# Patient Record
Sex: FEMALE | Race: WHITE | Employment: OTHER | URBAN - METROPOLITAN AREA
[De-identification: names, ages, dates, MRNs, and addresses within clinical notes are randomized per-mention and may not be internally consistent; named-entity substitution may affect disease eponyms.]

---

## 2020-07-16 ENCOUNTER — HOSPITAL ENCOUNTER (OUTPATIENT)
Dept: INFUSION THERAPY | Age: 75
Setting detail: INFUSION SERIES
Discharge: HOME | End: 2020-07-16
Payer: MEDICARE

## 2020-07-16 ENCOUNTER — HOSPITAL ENCOUNTER (OUTPATIENT)
Age: 75
Discharge: HOME | End: 2020-07-16
Payer: MEDICARE

## 2020-07-16 LAB
ABO/RH: NORMAL
ANTIBODY SCREEN: NORMAL

## 2020-07-16 PROCEDURE — 86901 BLOOD TYPING SEROLOGIC RH(D): CPT

## 2020-07-16 PROCEDURE — 86923 COMPATIBILITY TEST ELECTRIC: CPT

## 2020-07-16 PROCEDURE — P9016 RBC LEUKOCYTES REDUCED: HCPCS

## 2020-07-16 PROCEDURE — 36415 COLL VENOUS BLD VENIPUNCTURE: CPT

## 2020-07-16 PROCEDURE — 86900 BLOOD TYPING SEROLOGIC ABO: CPT

## 2020-07-16 PROCEDURE — 86850 RBC ANTIBODY SCREEN: CPT

## 2020-07-16 RX ORDER — 0.9 % SODIUM CHLORIDE 0.9 %
20 INTRAVENOUS SOLUTION INTRAVENOUS ONCE
Status: CANCELLED | OUTPATIENT
Start: 2020-07-16 | End: 2020-07-16

## 2020-07-16 RX ORDER — SODIUM CHLORIDE 0.9 % (FLUSH) 0.9 %
10 SYRINGE (ML) INJECTION PRN
Status: CANCELLED | OUTPATIENT
Start: 2020-07-16

## 2020-07-16 NOTE — PROGRESS NOTES
Outpatient [place] [address]    Peripheral Lab Draw    NAME:  Tatianna Vidal  YOB: 1945  MEDICAL RECORD NUMBER:  [id number]  Episode Date:  7/16/2020    Blood Draw Site: Location:right arm  Site cleansed with Chloroprep Scrub for 30 seconds: Yes  Site cleansed with Alcohol pads: Yes  Labs drawn with: 25 gauge             [x] Butterfly    [] Needle  Labs Obtained: Yes  Number of attempts: 1    Lab Test(s) Ordered: Type and Screen    Lab Draw Site:  Redness: No  Bruising: No   Edema: No  Pain: No     Response to treatment:  Well tolerated by patient.       Electronically signed by Darrold Gilford, RN on 7/16/2020 at 4:56 PM

## 2020-07-17 ENCOUNTER — HOSPITAL ENCOUNTER (OUTPATIENT)
Dept: INFUSION THERAPY | Age: 75
Setting detail: INFUSION SERIES
Discharge: HOME | End: 2020-07-17
Payer: MEDICARE

## 2020-07-17 VITALS
HEART RATE: 88 BPM | RESPIRATION RATE: 16 BRPM | OXYGEN SATURATION: 98 % | DIASTOLIC BLOOD PRESSURE: 70 MMHG | TEMPERATURE: 98.2 F | SYSTOLIC BLOOD PRESSURE: 155 MMHG

## 2020-07-17 LAB
BLOOD BANK DISPENSE STATUS: NORMAL
BLOOD BANK PRODUCT CODE: NORMAL
BPU ID: NORMAL
DESCRIPTION BLOOD BANK: NORMAL

## 2020-07-17 PROCEDURE — 2580000003 HC RX 258: Performed by: INTERNAL MEDICINE

## 2020-07-17 PROCEDURE — 2580000003 HC RX 258

## 2020-07-17 PROCEDURE — 36430 TRANSFUSION BLD/BLD COMPNT: CPT

## 2020-07-17 PROCEDURE — P9016 RBC LEUKOCYTES REDUCED: HCPCS

## 2020-07-17 RX ORDER — SODIUM CHLORIDE 0.9 % (FLUSH) 0.9 %
10 SYRINGE (ML) INJECTION PRN
Status: DISCONTINUED | OUTPATIENT
Start: 2020-07-17 | End: 2020-07-18 | Stop reason: HOSPADM

## 2020-07-17 RX ORDER — SODIUM CHLORIDE 9 MG/ML
INJECTION, SOLUTION INTRAVENOUS
Status: COMPLETED
Start: 2020-07-17 | End: 2020-07-17

## 2020-07-17 RX ORDER — 0.9 % SODIUM CHLORIDE 0.9 %
20 INTRAVENOUS SOLUTION INTRAVENOUS ONCE
Status: COMPLETED | OUTPATIENT
Start: 2020-07-17 | End: 2020-07-17

## 2020-07-17 RX ADMIN — Medication: at 12:50

## 2020-07-17 RX ADMIN — Medication 20 ML: at 12:45

## 2020-07-17 RX ADMIN — SODIUM CHLORIDE: 9 INJECTION, SOLUTION INTRAVENOUS at 12:50

## 2020-07-17 RX ADMIN — Medication 10 ML: at 15:30

## 2020-07-17 NOTE — PROGRESS NOTES
Outpatient [address] [facility]    Blood Transfusion    NAME:  Milton Duran OF BIRTH:  1945  MEDICAL RECORD NUMBER:  [id number]  DATE:  7/17/2020     Patient arrived to [facility]   [] per wheelchair   [x] ambulatory      Alert and oriented X 4. States receiving Procrit SQ and Fereheme Iv for anemia but this week HGB dropped to 8.3. 1 unit of PRBC ordered. States has increased SOB and fatigue. Consent Obtained: Yes  Is this the patient's first Transfusion? No. Received once in 2003   Did the patient experience any adverse reactions to previous Transfusion? No  Patient Active Problem List   Diagnosis    Acquired autoimmune hemolytic anemia (HCC)    Anemia in CKD (chronic kidney disease)    Chronic kidney disease, stage III (moderate) ([facility] [address]     Patient with Bone Marrow Suppression due to chemotherapy? No anemia due to kidney disfunction  Patient with history of GI bleeding? No  Patient with history of CHF? No  Patient with history of COPD?  No    Hemoglobin/Hematocrit:    Lab Results   Component Value Date    HGB 9.4 08/23/2017    HCT 30.4 08/23/2017       Special Transfusion Products Requested: No  Blood was:  []  Irradiated    [] Washed    [] Other    Is the patient experiencing any:  Fatigue:   []   None  []   Increase over baseline but not altering normal activities  [x]   Moderate of causing difficulty performing some activities  []   Severe or loss of ability to perform some activities  []   Bedridden or disabling    Dizziness or Lightheadedness:   [x]   None  []   No Interference  []   Interferes with functioning but not activities of daily living  []   Interferes with daily activies  []   Bedridden or disabling    Shortness of Breath:   []   None   [x]   Dyspneic on exertion   []   Dyspnea with normal activities  []   Dyspnea at rest    Breath Sounds: No increased work of breathing, Breath sounds clear to auscultation bilaterally and Good air exchange    Edema: none Location of edema: nothing. States has edema if legs are dependent for long periods. Sitting in recliner with feet elevated. Tachycardia: No    Heart Palpitations: No      Chest Pain: No    Premedicated: None ordered. Patient takes Tylenol and claritin daily AM and Tylenol and benadryl at HS  [] Tylenol 325 mg oral  [] Tylenol 650 mg oral    [] Benadryl 25 mg oral   [] Benadryl 50 mg oral  [] Other    Administered Blood via: [x] Peripheral access    [] PICC access    [] Port access    Numbers of units transfused 1 over 2 hours    Monitoring of vital signs and rate changes are documented in flow sheets. Response to treatment:  Well tolerated by patient. Education:    Verbalized understanding. Verbal and written instructions given    Assisted to discharge bridge per wheel chair and assisted to car.       Electronically signed by Santiago Del Castillo RN on 7/17/2020 at 12:04 PM

## 2022-10-25 ENCOUNTER — HOSPITAL ENCOUNTER (OUTPATIENT)
Dept: INFUSION THERAPY | Age: 77
Setting detail: INFUSION SERIES
Discharge: HOME | End: 2022-10-25
Payer: MEDICARE

## 2022-10-25 DIAGNOSIS — D59.10 ACQUIRED AUTOIMMUNE HEMOLYTIC ANEMIA (HCC): Primary | ICD-10-CM

## 2022-10-25 LAB
ABO/RH: NORMAL
ANTIBODY SCREEN: NORMAL

## 2022-10-25 PROCEDURE — 36415 COLL VENOUS BLD VENIPUNCTURE: CPT

## 2022-10-25 PROCEDURE — 86901 BLOOD TYPING SEROLOGIC RH(D): CPT

## 2022-10-25 PROCEDURE — 86850 RBC ANTIBODY SCREEN: CPT

## 2022-10-25 PROCEDURE — 86900 BLOOD TYPING SEROLOGIC ABO: CPT

## 2022-10-25 PROCEDURE — 86923 COMPATIBILITY TEST ELECTRIC: CPT

## 2022-10-25 PROCEDURE — P9016 RBC LEUKOCYTES REDUCED: HCPCS

## 2022-10-25 RX ORDER — SODIUM CHLORIDE 9 MG/ML
20 INJECTION, SOLUTION INTRAVENOUS CONTINUOUS
Status: CANCELLED | OUTPATIENT
Start: 2022-10-27

## 2022-10-25 RX ORDER — ALBUTEROL SULFATE 90 UG/1
4 AEROSOL, METERED RESPIRATORY (INHALATION) PRN
Status: CANCELLED | OUTPATIENT
Start: 2022-10-25

## 2022-10-25 RX ORDER — ONDANSETRON 2 MG/ML
8 INJECTION INTRAMUSCULAR; INTRAVENOUS
OUTPATIENT
Start: 2022-10-27

## 2022-10-25 RX ORDER — ALBUTEROL SULFATE 90 UG/1
4 AEROSOL, METERED RESPIRATORY (INHALATION) PRN
OUTPATIENT
Start: 2022-10-27

## 2022-10-25 RX ORDER — SODIUM CHLORIDE 0.9 % (FLUSH) 0.9 %
5-40 SYRINGE (ML) INJECTION PRN
Status: CANCELLED | OUTPATIENT
Start: 2022-10-27

## 2022-10-25 RX ORDER — EPINEPHRINE 1 MG/ML
0.3 INJECTION, SOLUTION, CONCENTRATE INTRAVENOUS PRN
OUTPATIENT
Start: 2022-10-27

## 2022-10-25 RX ORDER — DIPHENHYDRAMINE HYDROCHLORIDE 50 MG/ML
50 INJECTION INTRAMUSCULAR; INTRAVENOUS
OUTPATIENT
Start: 2022-10-27

## 2022-10-25 RX ORDER — DIPHENHYDRAMINE HYDROCHLORIDE 50 MG/ML
50 INJECTION INTRAMUSCULAR; INTRAVENOUS
Status: CANCELLED | OUTPATIENT
Start: 2022-10-25

## 2022-10-25 RX ORDER — SODIUM CHLORIDE 9 MG/ML
INJECTION, SOLUTION INTRAVENOUS CONTINUOUS
Status: CANCELLED | OUTPATIENT
Start: 2022-10-25

## 2022-10-25 RX ORDER — SODIUM CHLORIDE 0.9 % (FLUSH) 0.9 %
5-40 SYRINGE (ML) INJECTION PRN
Status: CANCELLED | OUTPATIENT
Start: 2022-10-25

## 2022-10-25 RX ORDER — ACETAMINOPHEN 325 MG/1
650 TABLET ORAL ONCE
Status: CANCELLED | OUTPATIENT
Start: 2022-10-27 | End: 2022-10-27

## 2022-10-25 RX ORDER — ONDANSETRON 2 MG/ML
8 INJECTION INTRAMUSCULAR; INTRAVENOUS
Status: CANCELLED | OUTPATIENT
Start: 2022-10-25

## 2022-10-25 RX ORDER — ACETAMINOPHEN 325 MG/1
650 TABLET ORAL
Status: CANCELLED | OUTPATIENT
Start: 2022-10-25

## 2022-10-25 RX ORDER — DIPHENHYDRAMINE HCL 25 MG
25 TABLET ORAL ONCE
Status: CANCELLED | OUTPATIENT
Start: 2022-10-25 | End: 2022-10-25

## 2022-10-25 RX ORDER — EPINEPHRINE 1 MG/ML
0.3 INJECTION, SOLUTION, CONCENTRATE INTRAVENOUS PRN
Status: CANCELLED | OUTPATIENT
Start: 2022-10-25

## 2022-10-25 RX ORDER — ACETAMINOPHEN 325 MG/1
650 TABLET ORAL ONCE
Status: CANCELLED | OUTPATIENT
Start: 2022-10-25 | End: 2022-10-25

## 2022-10-25 RX ORDER — DIPHENHYDRAMINE HCL 25 MG
25 TABLET ORAL ONCE
Status: CANCELLED | OUTPATIENT
Start: 2022-10-27 | End: 2022-10-27

## 2022-10-25 RX ORDER — SODIUM CHLORIDE 9 MG/ML
INJECTION, SOLUTION INTRAVENOUS CONTINUOUS
OUTPATIENT
Start: 2022-10-27

## 2022-10-25 RX ORDER — SODIUM CHLORIDE 9 MG/ML
20 INJECTION, SOLUTION INTRAVENOUS CONTINUOUS
Status: CANCELLED | OUTPATIENT
Start: 2022-10-25

## 2022-10-25 RX ORDER — ACETAMINOPHEN 325 MG/1
650 TABLET ORAL
OUTPATIENT
Start: 2022-10-27

## 2022-10-27 ENCOUNTER — HOSPITAL ENCOUNTER (OUTPATIENT)
Dept: INFUSION THERAPY | Age: 77
Setting detail: INFUSION SERIES
Discharge: HOME | End: 2022-10-27
Payer: MEDICARE

## 2022-10-27 VITALS
TEMPERATURE: 98 F | OXYGEN SATURATION: 97 % | RESPIRATION RATE: 20 BRPM | HEART RATE: 80 BPM | SYSTOLIC BLOOD PRESSURE: 128 MMHG | DIASTOLIC BLOOD PRESSURE: 84 MMHG

## 2022-10-27 DIAGNOSIS — D59.10 ACQUIRED AUTOIMMUNE HEMOLYTIC ANEMIA (HCC): Primary | ICD-10-CM

## 2022-10-27 LAB
A/G RATIO: 1.4 (ref 1.1–2.2)
ALBUMIN SERPL-MCNC: 3.7 G/DL (ref 3.4–5)
ALP BLD-CCNC: 37 U/L (ref 40–129)
ALT SERPL-CCNC: 12 U/L (ref 10–40)
ANION GAP SERPL CALCULATED.3IONS-SCNC: 12 MMOL/L (ref 3–16)
AST SERPL-CCNC: 19 U/L (ref 15–37)
BILIRUB SERPL-MCNC: 0.3 MG/DL (ref 0–1)
BLOOD BANK DISPENSE STATUS: NORMAL
BLOOD BANK PRODUCT CODE: NORMAL
BPU ID: NORMAL
BUN BLDV-MCNC: 36 MG/DL (ref 7–20)
CALCIUM SERPL-MCNC: 9 MG/DL (ref 8.3–10.6)
CHLORIDE BLD-SCNC: 102 MMOL/L (ref 99–110)
CO2: 24 MMOL/L (ref 21–32)
CREAT SERPL-MCNC: 2.3 MG/DL (ref 0.6–1.2)
DESCRIPTION BLOOD BANK: NORMAL
GFR SERPL CREATININE-BSD FRML MDRD: 21 ML/MIN/{1.73_M2}
GLUCOSE BLD-MCNC: 96 MG/DL (ref 70–99)
HAPTOGLOBIN: 106 MG/DL (ref 30–200)
HCT VFR BLD CALC: 20.9 % (ref 36–48)
IMMATURE RETIC FRACT: 0.7 (ref 0.21–0.37)
LACTATE DEHYDROGENASE: 270 U/L (ref 100–190)
POTASSIUM SERPL-SCNC: 5 MMOL/L (ref 3.5–5.1)
RETICULOCYTE ABSOLUTE COUNT: 0.17 M/UL (ref 0.02–0.1)
RETICULOCYTE COUNT PCT: 8.3 % (ref 0.5–2.18)
SODIUM BLD-SCNC: 138 MMOL/L (ref 136–145)
T4 FREE: 1.3 NG/DL (ref 0.9–1.8)
TOTAL PROTEIN: 6.3 G/DL (ref 6.4–8.2)
TSH REFLEX: 2.83 UIU/ML (ref 0.27–4.2)

## 2022-10-27 PROCEDURE — 84155 ASSAY OF PROTEIN SERUM: CPT

## 2022-10-27 PROCEDURE — 82525 ASSAY OF COPPER: CPT

## 2022-10-27 PROCEDURE — 83615 LACTATE (LD) (LDH) ENZYME: CPT

## 2022-10-27 PROCEDURE — 2580000003 HC RX 258

## 2022-10-27 PROCEDURE — 84439 ASSAY OF FREE THYROXINE: CPT

## 2022-10-27 PROCEDURE — 36430 TRANSFUSION BLD/BLD COMPNT: CPT

## 2022-10-27 PROCEDURE — P9016 RBC LEUKOCYTES REDUCED: HCPCS

## 2022-10-27 PROCEDURE — 83655 ASSAY OF LEAD: CPT

## 2022-10-27 PROCEDURE — 83883 ASSAY NEPHELOMETRY NOT SPEC: CPT

## 2022-10-27 PROCEDURE — 86757 RICKETTSIA ANTIBODY: CPT

## 2022-10-27 PROCEDURE — 83010 ASSAY OF HAPTOGLOBIN QUANT: CPT

## 2022-10-27 PROCEDURE — 86334 IMMUNOFIX E-PHORESIS SERUM: CPT

## 2022-10-27 PROCEDURE — 84443 ASSAY THYROID STIM HORMONE: CPT

## 2022-10-27 PROCEDURE — 84165 PROTEIN E-PHORESIS SERUM: CPT

## 2022-10-27 PROCEDURE — 85045 AUTOMATED RETICULOCYTE COUNT: CPT

## 2022-10-27 PROCEDURE — 80053 COMPREHEN METABOLIC PANEL: CPT

## 2022-10-27 PROCEDURE — 6370000000 HC RX 637 (ALT 250 FOR IP): Performed by: INTERNAL MEDICINE

## 2022-10-27 PROCEDURE — 86747 PARVOVIRUS ANTIBODY: CPT

## 2022-10-27 RX ORDER — DIPHENHYDRAMINE HYDROCHLORIDE 50 MG/ML
50 INJECTION INTRAMUSCULAR; INTRAVENOUS
OUTPATIENT
Start: 2022-10-27

## 2022-10-27 RX ORDER — SODIUM CHLORIDE 9 MG/ML
INJECTION, SOLUTION INTRAVENOUS CONTINUOUS
OUTPATIENT
Start: 2022-10-27

## 2022-10-27 RX ORDER — SODIUM CHLORIDE 0.9 % (FLUSH) 0.9 %
5-40 SYRINGE (ML) INJECTION PRN
Status: CANCELLED | OUTPATIENT
Start: 2022-10-27

## 2022-10-27 RX ORDER — DIPHENHYDRAMINE HCL 25 MG
25 TABLET ORAL ONCE
Status: CANCELLED | OUTPATIENT
Start: 2022-10-27 | End: 2022-10-27

## 2022-10-27 RX ORDER — DIPHENHYDRAMINE HCL 25 MG
25 TABLET ORAL ONCE
Status: COMPLETED | OUTPATIENT
Start: 2022-10-27 | End: 2022-10-27

## 2022-10-27 RX ORDER — ACETAMINOPHEN 325 MG/1
650 TABLET ORAL ONCE
Status: CANCELLED | OUTPATIENT
Start: 2022-10-27 | End: 2022-10-27

## 2022-10-27 RX ORDER — EPINEPHRINE 1 MG/ML
0.3 INJECTION, SOLUTION, CONCENTRATE INTRAVENOUS PRN
OUTPATIENT
Start: 2022-10-27

## 2022-10-27 RX ORDER — SODIUM CHLORIDE 0.9 % (FLUSH) 0.9 %
5-40 SYRINGE (ML) INJECTION PRN
Status: DISCONTINUED | OUTPATIENT
Start: 2022-10-27 | End: 2022-10-28 | Stop reason: HOSPADM

## 2022-10-27 RX ORDER — ACETAMINOPHEN 325 MG/1
650 TABLET ORAL
OUTPATIENT
Start: 2022-10-27

## 2022-10-27 RX ORDER — SODIUM CHLORIDE 9 MG/ML
INJECTION, SOLUTION INTRAVENOUS
Status: COMPLETED
Start: 2022-10-27 | End: 2022-10-27

## 2022-10-27 RX ORDER — ACETAMINOPHEN 325 MG/1
650 TABLET ORAL ONCE
Status: COMPLETED | OUTPATIENT
Start: 2022-10-27 | End: 2022-10-27

## 2022-10-27 RX ORDER — ONDANSETRON 2 MG/ML
8 INJECTION INTRAMUSCULAR; INTRAVENOUS
OUTPATIENT
Start: 2022-10-27

## 2022-10-27 RX ORDER — SODIUM CHLORIDE 9 MG/ML
20 INJECTION, SOLUTION INTRAVENOUS CONTINUOUS
Status: CANCELLED | OUTPATIENT
Start: 2022-10-27

## 2022-10-27 RX ORDER — SODIUM CHLORIDE 9 MG/ML
20 INJECTION, SOLUTION INTRAVENOUS CONTINUOUS
Status: DISCONTINUED | OUTPATIENT
Start: 2022-10-27 | End: 2022-10-28 | Stop reason: HOSPADM

## 2022-10-27 RX ORDER — ALBUTEROL SULFATE 90 UG/1
4 AEROSOL, METERED RESPIRATORY (INHALATION) PRN
OUTPATIENT
Start: 2022-10-27

## 2022-10-27 RX ADMIN — SODIUM CHLORIDE 20 ML/HR: 9 INJECTION, SOLUTION INTRAVENOUS at 12:46

## 2022-10-27 RX ADMIN — DIPHENHYDRAMINE HCL 25 MG: 25 TABLET ORAL at 12:45

## 2022-10-27 RX ADMIN — ACETAMINOPHEN 650 MG: 325 TABLET ORAL at 12:44

## 2022-10-27 ASSESSMENT — PAIN SCALES - GENERAL: PAINLEVEL_OUTOF10: 0

## 2022-10-27 NOTE — DISCHARGE INSTRUCTIONS
Outpatient Infusion Discharge Instructions  [address] [address] [place] [address]  Telephone: [phone]    NAME:  Cheri Falk OF BIRTH:  1945  MEDICAL RECORD NUMBER:  [id number]  DATE:  @ED@    Reason for Outpatient Infusion Visit: ***    If you develop any these symptoms please contact you Doctor    [] Nausea and/or vomiting not relieved with medication   [] Swelling, redness, and/or bleeding at injection or IV site    [] Fever or chills  [] Rash or itching   [] Shortness of breath  [] Please review After Visit Summary (AVS) information on    [] Other      Outpatient [address]: Should you experience any significant changes in your health or have questions about your care please contact the [address] [facility] 8:00 am - 4:00 pm.  If you need help outside these hours and cannot wait until we are again available, contact your Primary Care Physician or go to the hospital emergency room.        Electronically signed by Rock Day RN on 10/27/2022 at 3:18 PM Outpatient Infusion Discharge Instructions  [facility]  [address] [place] [address]  Telephone: [phone]    NAME:  Faye Ellsworth:  1945  MEDICAL RECORD NUMBER:  [id number]  DATE:  @ED@    Reason for Outpatient Infusion Visit: ***    If you develop any these symptoms please contact you Doctor    [x] Nausea and/or vomiting not relieved with medication   [x] Swelling, redness, and/or bleeding at injection or IV site    [x] Fever or chills  [x] Rash or itching   [x] Shortness of breath  [] Please review After Visit Summary (AVS) information on    [] Other      Outpatient [address]: Should you experience any significant changes in your health or have questions about your care please contact the [address] 8:00 am - 4:00 pm.  If you need help outside these hours and cannot wait until we are again available, contact your Primary Care Physician or go to the hospital emergency room.        Electronically signed by Pat Duran RN on 10/27/2022 at 3:18 PM

## 2022-10-27 NOTE — PROGRESS NOTES
Outpatient [address] [facility]    Blood Transfusion    NAME:  Saúl Kimball OF BIRTH:  1945  MEDICAL RECORD NUMBER:  [id number]  DATE:  10/27/2022     Patient arrived to [facility]   [] per wheelchair   [x] ambulatory         Consent Obtained: Yes  Is this the patient's first Transfusion? No    Did the patient experience any adverse reactions to previous Transfusion? NA  Patient Active Problem List   Diagnosis    Acquired autoimmune hemolytic anemia (HCC)    Anemia in CKD (chronic kidney disease)    Chronic kidney disease, stage III (moderate) ([facility] [address]     Patient with Bone Marrow Suppression due to chemotherapy? No   Patient with history of GI bleeding? No  Patient with history of CHF? No  Patient with history of COPD?  No    Hemoglobin/Hematocrit:    Lab Results   Component Value Date/Time    HGB 9.4 08/23/2017 02:30 PM    HCT 20.9 10/27/2022 12:00 PM       Special Transfusion Products Requested: No  Blood was:  []  Irradiated    [] Washed    [] Other    Is the patient experiencing any:  Fatigue:   []   None  [x]   Increase over baseline but not altering normal activities  []   Moderate of causing difficulty performing some activities  []   Severe or loss of ability to perform some activities  []   Bedridden or disabling    Dizziness or Lightheadedness:   []   None  [x]   No Interference  []   Interferes with functioning but not activities of daily living  []   Interferes with daily activies  []   Bedridden or disabling    Shortness of Breath:   []   None   [x]   Dyspneic on exertion   [x]   Dyspnea with normal activities  []   Dyspnea at rest    Breath Sounds: No increased work of breathing, Breath sounds clear to auscultation bilaterally, Good air exchange, and No crackles    Edema: non-pitting Location of edema: nothing    Tachycardia: No    Heart Palpitations: No      Chest Pain: No    Premedicated:  [] Tylenol 325 mg oral  [x] Tylenol 650 mg oral    [x] Benadryl 25 mg oral   [] Benadryl 50 mg oral  [] Other    Administered Blood via: [] Peripheral access    [] PICC access    [] Port access    Numbers of units transfused 1 over 2 hours    Monitoring of vital signs and rate changes are documented in flow sheets. Response to treatment:  Well tolerated by patient.     Education:    Indicates understanding      Electronically signed by Kristie Hammond RN on 10/27/2022 at 1:12 PM

## 2022-10-28 LAB
ALBUMIN SERPL-MCNC: 3.1 G/DL (ref 3.1–4.9)
ALPHA-1-GLOBULIN: 0.4 G/DL (ref 0.2–0.4)
ALPHA-2-GLOBULIN: 0.8 G/DL (ref 0.4–1.1)
BETA GLOBULIN: 1.3 G/DL (ref 0.9–1.6)
GAMMA GLOBULIN: 0.8 G/DL (ref 0.6–1.8)
KAPPA, FREE LIGHT CHAINS, SERUM: 59.08 MG/L (ref 3.3–19.4)
KAPPA/LAMBDA RATIO: 1.34 (ref 0.26–1.65)
KAPPA/LAMBDA TEST COMMENT: ABNORMAL
LAMBDA, FREE LIGHT CHAINS, SERUM: 44.08 MG/L (ref 5.71–26.3)
SPE/IFE INTERPRETATION: NORMAL

## 2022-10-29 LAB — COPPER: 127.6 UG/DL (ref 80–155)

## 2022-10-30 LAB — LEAD LEVEL BLOOD: <2 UG/DL

## 2022-11-01 LAB
PARVOVIRUS B19 IGG ANTIBODY: 7.01 IV
PARVOVIRUS B19 IGM ANTIBODY: 0.07 IV

## 2022-12-01 ENCOUNTER — HOSPITAL ENCOUNTER (OUTPATIENT)
Dept: INTERVENTIONAL RADIOLOGY/VASCULAR | Age: 77
Discharge: HOME | End: 2022-12-01
Payer: MEDICARE

## 2022-12-01 ENCOUNTER — HOSPITAL ENCOUNTER (OUTPATIENT)
Dept: CT IMAGING | Age: 77
Discharge: HOME | End: 2022-12-01
Payer: MEDICARE

## 2022-12-01 VITALS
SYSTOLIC BLOOD PRESSURE: 170 MMHG | WEIGHT: 276 LBS | OXYGEN SATURATION: 94 % | BODY MASS INDEX: 52.11 KG/M2 | TEMPERATURE: 97 F | RESPIRATION RATE: 18 BRPM | DIASTOLIC BLOOD PRESSURE: 77 MMHG | HEART RATE: 84 BPM | HEIGHT: 61 IN

## 2022-12-01 DIAGNOSIS — D59.10 AUTOIMMUNE HEMOLYTIC ANEMIA (HCC): ICD-10-CM

## 2022-12-01 LAB
BASOPHILS ABSOLUTE: 0 K/UL (ref 0–0.2)
BASOPHILS RELATIVE PERCENT: 0.6 %
EOSINOPHILS ABSOLUTE: 0.1 K/UL (ref 0–0.6)
EOSINOPHILS RELATIVE PERCENT: 2 %
HCT VFR BLD CALC: 31 % (ref 36–48)
HEMOGLOBIN: 10 G/DL (ref 12–16)
IMMATURE RETIC FRACT: 0.63 (ref 0.21–0.37)
INR BLD: 1.14 (ref 0.87–1.14)
LYMPHOCYTES ABSOLUTE: 1.1 K/UL (ref 1–5.1)
LYMPHOCYTES RELATIVE PERCENT: 19.6 %
MCH RBC QN AUTO: 33.9 PG (ref 26–34)
MCHC RBC AUTO-ENTMCNC: 32.3 G/DL (ref 31–36)
MCV RBC AUTO: 105.1 FL (ref 80–100)
MONOCYTES ABSOLUTE: 0.4 K/UL (ref 0–1.3)
MONOCYTES RELATIVE PERCENT: 6.8 %
NEUTROPHILS ABSOLUTE: 4 K/UL (ref 1.7–7.7)
NEUTROPHILS RELATIVE PERCENT: 71 %
PDW BLD-RTO: 15.9 % (ref 12.4–15.4)
PLATELET # BLD: 248 K/UL (ref 135–450)
PMV BLD AUTO: 6.9 FL (ref 5–10.5)
PROTHROMBIN TIME: 14.6 SEC (ref 11.7–14.5)
RBC # BLD: 2.95 M/UL (ref 4–5.2)
RETICULOCYTE ABSOLUTE COUNT: 0.17 M/UL (ref 0.02–0.1)
RETICULOCYTE COUNT PCT: 5.76 % (ref 0.5–2.18)
WBC # BLD: 5.6 K/UL (ref 4–11)

## 2022-12-01 PROCEDURE — 2709999900 IR BIOPSY BONE MARROW

## 2022-12-01 PROCEDURE — 7100000010 HC PHASE II RECOVERY - FIRST 15 MIN

## 2022-12-01 PROCEDURE — 38222 DX BONE MARROW BX & ASPIR: CPT

## 2022-12-01 PROCEDURE — 7100000011 HC PHASE II RECOVERY - ADDTL 15 MIN

## 2022-12-01 PROCEDURE — 85025 COMPLETE CBC W/AUTO DIFF WBC: CPT

## 2022-12-01 PROCEDURE — 99152 MOD SED SAME PHYS/QHP 5/>YRS: CPT

## 2022-12-01 PROCEDURE — 77002 NEEDLE LOCALIZATION BY XRAY: CPT

## 2022-12-01 PROCEDURE — 6360000002 HC RX W HCPCS: Performed by: RADIOLOGY

## 2022-12-01 PROCEDURE — 85610 PROTHROMBIN TIME: CPT

## 2022-12-01 PROCEDURE — 85045 AUTOMATED RETICULOCYTE COUNT: CPT

## 2022-12-01 PROCEDURE — 88184 FLOWCYTOMETRY/ TC 1 MARKER: CPT

## 2022-12-01 RX ORDER — FENTANYL CITRATE 50 UG/ML
INJECTION, SOLUTION INTRAMUSCULAR; INTRAVENOUS DAILY PRN
Status: COMPLETED | OUTPATIENT
Start: 2022-12-01 | End: 2022-12-01

## 2022-12-01 RX ORDER — MIDAZOLAM HYDROCHLORIDE 1 MG/ML
INJECTION INTRAMUSCULAR; INTRAVENOUS DAILY PRN
Status: COMPLETED | OUTPATIENT
Start: 2022-12-01 | End: 2022-12-01

## 2022-12-01 RX ADMIN — FENTANYL CITRATE 50 MCG: 50 INJECTION INTRAMUSCULAR; INTRAVENOUS at 12:52

## 2022-12-01 RX ADMIN — MIDAZOLAM 1 MG: 1 INJECTION INTRAMUSCULAR; INTRAVENOUS at 12:52

## 2022-12-01 RX ADMIN — FENTANYL CITRATE 50 MCG: 50 INJECTION INTRAMUSCULAR; INTRAVENOUS at 12:58

## 2022-12-01 RX ADMIN — MIDAZOLAM 1 MG: 1 INJECTION INTRAMUSCULAR; INTRAVENOUS at 12:58

## 2022-12-01 ASSESSMENT — PAIN - FUNCTIONAL ASSESSMENT: PAIN_FUNCTIONAL_ASSESSMENT: 0-10

## 2022-12-01 NOTE — PROGRESS NOTES
Assisted to and from BR to void. Mid lower back dressing with no increase in drainage. Area remains without edema, bruising or bleeding. No complaints. Stable for discharge.

## 2022-12-01 NOTE — DISCHARGE INSTRUCTIONS
[facility]  [address]  Telephone: [phone]      PATIENT NAME: [facility]  MEDICAL RECORD NUMBER:  [id number]  TODAY'S DATE: @ED@      Discharge Instructions - Post Bone Marrow Biopsy      [x]You may place a cold pack  on top of the dressing for at least 3 hours removing it every 20 minutes for 5 minutes after your biopsy, if you are having pain. [x] Do not take Aspirin or Aspirin products the day of your procedure. [x] Your physician has instructed you to take Tylenol (Acetaminophen) the day of your biopsy for any discomfort. [x] Watch for excessive bleeding, increased pain, fever, redness or drainage at your biopsy site. If this occurs call Dr. Twila Hammonds MD .    [x] Do not participate in any strenuous exercise for 48 hours after your biopsy, such as tennis, aerobics, weight lifting and household activities. Do not lift over 10 pounds for two days. [x] You may remove your dressing tomorrow and shower. Do not submerge your biopsy site in water for 4 days (ie tub, pool, hot tub, etc)    Your physician will receive a report within 2-3 business days. Please contact the office for results. The biopsy site may feel sore for several days. It can help to walk, take pain medicine, and put ice packs on the site. You will probably be able to return to work and your usual activities the day after the procedure. Your doctor or nurse will call you with the results of your test.    Call your doctor now or seek immediate medical care if:  You have signs of infection, such as: Increased pain, swelling, warmth, or redness. Red streaks leading from the biopsy site. Pus draining from the biopsy site. Swollen lymph nodes in your neck, armpits, or groin. A fever. Your physician will receive a report within 2-3 business days. Please contact the office for results.

## 2022-12-01 NOTE — DISCHARGE INSTRUCTIONS
[facility]  [address]  Telephone: [phone]      PATIENT NAME: Maria R Gutierrez  MEDICAL RECORD NUMBER:  [id number]  TODAY'S DATE: @ED@      Discharge Instructions - Post Bone Marrow Biopsy      [x]You may place a cold pack  on top of the dressing for at least 3 hours removing it every 20 minutes for 5 minutes after your biopsy, if you are having pain. [x] Do not take Aspirin or Aspirin products the day of your procedure. [x] Your physician has instructed you to take Tylenol (Acetaminophen) the day of your biopsy for any discomfort. [x] Watch for excessive bleeding, increased pain, fever, redness or drainage at your biopsy site. If this occurs call Dr. Rosemarie Rai MD .    [x] Do not participate in any strenuous exercise for 48 hours after your biopsy, such as tennis, aerobics, weight lifting and household activities. Do not lift over 10 pounds for two days. [x] You may remove your dressing tomorrow and shower. Do not submerge your biopsy site in water for 4 days (ie tub, pool, hot tub, etc)    Your physician will receive a report within 2-3 business days. Please contact the office for results. The biopsy site may feel sore for several days. It can help to walk, take pain medicine, and put ice packs on the site. You will probably be able to return to work and your usual activities the day after the procedure. Your doctor or nurse will call you with the results of your test.    Call your doctor now or seek immediate medical care if:  You have signs of infection, such as: Increased pain, swelling, warmth, or redness. Red streaks leading from the biopsy site. Pus draining from the biopsy site. Swollen lymph nodes in your neck, armpits, or groin. A fever. Your physician will receive a report within 2-3 business days. Please contact the office for results.

## 2022-12-01 NOTE — PROGRESS NOTES
HOB raised. Resting quietly. Discharge instructions given to patient and . Verbalize understanding. No complaints.

## 2022-12-01 NOTE — PROGRESS NOTES
Received from Radiology. Admitted to Phase 2 care. Awake and alert, respirations easy and even. Oriented to room and surroundings. Mid lower back dressing intact with small amount bloody drainage noted. Area without swelling, bleeding or bruising. No complaints.

## 2022-12-01 NOTE — PRE SEDATION
Sedation Pre-Procedure Note    Patient Name: Emily Stone   YOB: 1945  Room/Bed: Room/bed info not found  Medical Record Number: [id number]  Date: 12/1/2022   Time: 12:50 PM       Indication:  bone marrow biopsy/aspiration    Consent: I have discussed with the patient and/or the patient representative the indication, alternatives, and the possible risks and/or complications of the planned procedure and the anesthesia methods. The patient and/or patient representative appear to understand and agree to proceed. Vital Signs:   Vitals:    12/01/22 1128   BP: (!) 173/83   Pulse: 86   Resp: 18   Temp: 97.8 °F (36.6 °C)   SpO2: 99%       Past Medical History:   has a past medical history of Anemia, Arthritis, Chronic kidney disease, H/O bladder problems, Hyperlipidemia, Hypertension, and Thyroid disease. Past Surgical History:   has a past surgical history that includes knee surgery; Hysterectomy; Dilation and curettage of uterus; Appendectomy; and Cholecystectomy. Medications:   Scheduled Meds:   Continuous Infusions:   PRN Meds:   Home Meds:   Prior to Admission medications    Medication Sig Start Date End Date Taking? Authorizing Provider   Ergocalciferol (VITAMIN D2 PO) Take 50,000 Units by mouth once a week    Historical Provider, MD   folic acid (FOLVITE) 1 MG tablet TAKE ONE TABLET BY MOUTH DAILY 7/9/17   Pablito Gallagher MD   solifenacin (VESICARE) 10 MG tablet Take 5 mg by mouth daily    Historical Provider, MD   Fluticasone Propionate (FLONASE NA) by Nasal route    Historical Provider, MD   acetaminophen (TYLENOL) 500 MG tablet Take 500 mg by mouth every 4 hours as needed for Pain    Historical Provider, MD   vitamin B-12 (CYANOCOBALAMIN) 1000 MCG tablet Take 1,000 mcg by mouth daily    Historical Provider, MD   losartan (COZAAR) 100 MG tablet Take 100 mg by mouth daily. Historical Provider, MD   simvastatin (ZOCOR) 40 MG tablet Take 40 mg by mouth nightly.     Historical Provider, MD loratadine (CLARITIN) 10 MG capsule Take  by mouth. Historical Provider, MD   fenofibrate (TRICOR) 145 MG tablet Take 160 mg by mouth daily     Historical Provider, MD   hydrochlorothiazide (HYDRODIURIL) 25 MG tablet Take 25 mg by mouth daily. Historical Provider, MD   melatonin 3 MG TABS tablet Take 3 mg by mouth 2 times daily. Historical Provider, MD   levothyroxine (SYNTHROID) 150 MCG tablet Take 150 mcg by mouth Daily. Historical Provider, MD   Valerian Root 500 MG CAPS Take  by mouth.     Historical Provider, MD          Pre-Sedation Documentation and Exam:   Elderly female in nad resting on bed  Aaox3 intact grossly    Mallampati Airway Assessment:  normal    Prior History of Anesthesia Complications:   none    ASA Classification:  Class 3 - A patient with severe systemic disease that limits activity but is not incapacitating    Sedation/ Anesthesia Plan:   intravenous sedation    Medications Planned:   midazolam (Versed) intravenously and fentanyl intravenously    Patient is an appropriate candidate for plan of sedation: yes    Electronically signed by Maicol De La Rosa MD on 12/1/2022 at 12:50 PM

## 2023-06-06 ENCOUNTER — HOSPITAL ENCOUNTER (OUTPATIENT)
Dept: INFUSION THERAPY | Age: 78
Setting detail: INFUSION SERIES
Discharge: HOME | End: 2023-06-06
Payer: MEDICARE

## 2023-06-06 DIAGNOSIS — D59.10 ACQUIRED AUTOIMMUNE HEMOLYTIC ANEMIA (HCC): Primary | ICD-10-CM

## 2023-06-06 LAB
ABO + RH BLD: NORMAL
BLD GP AB SCN SERPL QL: NORMAL

## 2023-06-06 PROCEDURE — P9016 RBC LEUKOCYTES REDUCED: HCPCS

## 2023-06-06 PROCEDURE — 86923 COMPATIBILITY TEST ELECTRIC: CPT

## 2023-06-06 PROCEDURE — 86850 RBC ANTIBODY SCREEN: CPT

## 2023-06-06 PROCEDURE — 86901 BLOOD TYPING SEROLOGIC RH(D): CPT

## 2023-06-06 PROCEDURE — 36415 COLL VENOUS BLD VENIPUNCTURE: CPT

## 2023-06-06 PROCEDURE — 86900 BLOOD TYPING SEROLOGIC ABO: CPT

## 2023-06-06 RX ORDER — SODIUM CHLORIDE 0.9 % (FLUSH) 0.9 %
5-40 SYRINGE (ML) INJECTION PRN
Status: CANCELLED | OUTPATIENT
Start: 2023-06-07

## 2023-06-06 RX ORDER — SODIUM CHLORIDE 9 MG/ML
INJECTION, SOLUTION INTRAVENOUS CONTINUOUS
Status: CANCELLED | OUTPATIENT
Start: 2023-06-07

## 2023-06-06 RX ORDER — EPINEPHRINE 1 MG/ML
0.3 INJECTION, SOLUTION, CONCENTRATE INTRAVENOUS PRN
Status: CANCELLED | OUTPATIENT
Start: 2023-06-06

## 2023-06-06 RX ORDER — DIPHENHYDRAMINE HYDROCHLORIDE 50 MG/ML
50 INJECTION INTRAMUSCULAR; INTRAVENOUS
Status: CANCELLED | OUTPATIENT
Start: 2023-06-07

## 2023-06-06 RX ORDER — ONDANSETRON 2 MG/ML
8 INJECTION INTRAMUSCULAR; INTRAVENOUS
Status: CANCELLED | OUTPATIENT
Start: 2023-06-07

## 2023-06-06 RX ORDER — ACETAMINOPHEN 325 MG/1
650 TABLET ORAL
Status: CANCELLED | OUTPATIENT
Start: 2023-06-06

## 2023-06-06 RX ORDER — ONDANSETRON 2 MG/ML
8 INJECTION INTRAMUSCULAR; INTRAVENOUS
OUTPATIENT
Start: 2023-06-06

## 2023-06-06 RX ORDER — DIPHENHYDRAMINE HCL 25 MG
25 TABLET ORAL ONCE
Status: CANCELLED | OUTPATIENT
Start: 2023-06-06 | End: 2023-06-06

## 2023-06-06 RX ORDER — EPINEPHRINE 1 MG/ML
0.3 INJECTION, SOLUTION, CONCENTRATE INTRAVENOUS PRN
OUTPATIENT
Start: 2023-06-06

## 2023-06-06 RX ORDER — ALBUTEROL SULFATE 90 UG/1
4 AEROSOL, METERED RESPIRATORY (INHALATION) PRN
OUTPATIENT
Start: 2023-06-06

## 2023-06-06 RX ORDER — EPINEPHRINE 1 MG/ML
0.3 INJECTION, SOLUTION, CONCENTRATE INTRAVENOUS PRN
Status: CANCELLED | OUTPATIENT
Start: 2023-06-07

## 2023-06-06 RX ORDER — DIPHENHYDRAMINE HYDROCHLORIDE 50 MG/ML
50 INJECTION INTRAMUSCULAR; INTRAVENOUS
OUTPATIENT
Start: 2023-06-06

## 2023-06-06 RX ORDER — SODIUM CHLORIDE 9 MG/ML
25 INJECTION, SOLUTION INTRAVENOUS PRN
OUTPATIENT
Start: 2023-06-06

## 2023-06-06 RX ORDER — SODIUM CHLORIDE 9 MG/ML
20 INJECTION, SOLUTION INTRAVENOUS CONTINUOUS
Status: CANCELLED | OUTPATIENT
Start: 2023-06-07

## 2023-06-06 RX ORDER — ACETAMINOPHEN 325 MG/1
650 TABLET ORAL
OUTPATIENT
Start: 2023-06-06

## 2023-06-06 RX ORDER — ACETAMINOPHEN 325 MG/1
650 TABLET ORAL ONCE
Status: CANCELLED | OUTPATIENT
Start: 2023-06-07 | End: 2023-06-07

## 2023-06-06 RX ORDER — DIPHENHYDRAMINE HCL 25 MG
25 TABLET ORAL ONCE
Status: CANCELLED | OUTPATIENT
Start: 2023-06-07 | End: 2023-06-07

## 2023-06-06 RX ORDER — DIPHENHYDRAMINE HYDROCHLORIDE 50 MG/ML
50 INJECTION INTRAMUSCULAR; INTRAVENOUS
Status: CANCELLED | OUTPATIENT
Start: 2023-06-06

## 2023-06-06 RX ORDER — SODIUM CHLORIDE 9 MG/ML
20 INJECTION, SOLUTION INTRAVENOUS CONTINUOUS
Status: CANCELLED | OUTPATIENT
Start: 2023-06-06

## 2023-06-06 RX ORDER — ACETAMINOPHEN 325 MG/1
650 TABLET ORAL ONCE
Status: CANCELLED | OUTPATIENT
Start: 2023-06-06 | End: 2023-06-06

## 2023-06-06 RX ORDER — ALBUTEROL SULFATE 90 UG/1
4 AEROSOL, METERED RESPIRATORY (INHALATION) PRN
Status: CANCELLED | OUTPATIENT
Start: 2023-06-06

## 2023-06-06 RX ORDER — ALBUTEROL SULFATE 90 UG/1
4 AEROSOL, METERED RESPIRATORY (INHALATION) PRN
Status: CANCELLED | OUTPATIENT
Start: 2023-06-07

## 2023-06-06 RX ORDER — SODIUM CHLORIDE 9 MG/ML
INJECTION, SOLUTION INTRAVENOUS CONTINUOUS
OUTPATIENT
Start: 2023-06-06

## 2023-06-06 RX ORDER — ONDANSETRON 2 MG/ML
8 INJECTION INTRAMUSCULAR; INTRAVENOUS
Status: CANCELLED | OUTPATIENT
Start: 2023-06-06

## 2023-06-06 RX ORDER — ACETAMINOPHEN 325 MG/1
650 TABLET ORAL
Status: CANCELLED | OUTPATIENT
Start: 2023-06-07

## 2023-06-06 RX ORDER — SODIUM CHLORIDE 0.9 % (FLUSH) 0.9 %
5-40 SYRINGE (ML) INJECTION PRN
Status: CANCELLED | OUTPATIENT
Start: 2023-06-06

## 2023-06-06 RX ORDER — SODIUM CHLORIDE 9 MG/ML
25 INJECTION, SOLUTION INTRAVENOUS PRN
Status: CANCELLED | OUTPATIENT
Start: 2023-06-07

## 2023-06-06 RX ORDER — SODIUM CHLORIDE 9 MG/ML
INJECTION, SOLUTION INTRAVENOUS CONTINUOUS
Status: CANCELLED | OUTPATIENT
Start: 2023-06-06

## 2023-06-07 ENCOUNTER — HOSPITAL ENCOUNTER (OUTPATIENT)
Dept: INFUSION THERAPY | Age: 78
Setting detail: INFUSION SERIES
Discharge: HOME | End: 2023-06-07
Payer: MEDICARE

## 2023-06-07 VITALS
RESPIRATION RATE: 16 BRPM | BODY MASS INDEX: 52.67 KG/M2 | HEIGHT: 61 IN | WEIGHT: 279 LBS | TEMPERATURE: 98.5 F | SYSTOLIC BLOOD PRESSURE: 164 MMHG | DIASTOLIC BLOOD PRESSURE: 79 MMHG | OXYGEN SATURATION: 96 % | HEART RATE: 56 BPM

## 2023-06-07 DIAGNOSIS — D59.10 ACQUIRED AUTOIMMUNE HEMOLYTIC ANEMIA (HCC): Primary | ICD-10-CM

## 2023-06-07 PROCEDURE — 2580000003 HC RX 258: Performed by: INTERNAL MEDICINE

## 2023-06-07 PROCEDURE — 6370000000 HC RX 637 (ALT 250 FOR IP): Performed by: INTERNAL MEDICINE

## 2023-06-07 RX ORDER — ALBUTEROL SULFATE 90 UG/1
4 AEROSOL, METERED RESPIRATORY (INHALATION) PRN
OUTPATIENT
Start: 2023-06-07

## 2023-06-07 RX ORDER — SODIUM CHLORIDE 9 MG/ML
INJECTION, SOLUTION INTRAVENOUS CONTINUOUS
OUTPATIENT
Start: 2023-06-07

## 2023-06-07 RX ORDER — DIPHENHYDRAMINE HYDROCHLORIDE 50 MG/ML
50 INJECTION INTRAMUSCULAR; INTRAVENOUS
OUTPATIENT
Start: 2023-06-07

## 2023-06-07 RX ORDER — SODIUM CHLORIDE 0.9 % (FLUSH) 0.9 %
5-40 SYRINGE (ML) INJECTION PRN
Status: CANCELLED | OUTPATIENT
Start: 2023-06-07

## 2023-06-07 RX ORDER — ONDANSETRON 2 MG/ML
8 INJECTION INTRAMUSCULAR; INTRAVENOUS
OUTPATIENT
Start: 2023-06-07

## 2023-06-07 RX ORDER — SODIUM CHLORIDE 9 MG/ML
20 INJECTION, SOLUTION INTRAVENOUS CONTINUOUS
Status: CANCELLED | OUTPATIENT
Start: 2023-06-07

## 2023-06-07 RX ORDER — SODIUM CHLORIDE 9 MG/ML
INJECTION, SOLUTION INTRAVENOUS
Status: DISPENSED
Start: 2023-06-07 | End: 2023-06-07

## 2023-06-07 RX ORDER — SODIUM CHLORIDE 0.9 % (FLUSH) 0.9 %
5-40 SYRINGE (ML) INJECTION PRN
Status: DISCONTINUED | OUTPATIENT
Start: 2023-06-07 | End: 2023-06-08 | Stop reason: HOSPADM

## 2023-06-07 RX ORDER — ACETAMINOPHEN 325 MG/1
650 TABLET ORAL ONCE
Status: COMPLETED | OUTPATIENT
Start: 2023-06-07 | End: 2023-06-07

## 2023-06-07 RX ORDER — ACETAMINOPHEN 325 MG/1
650 TABLET ORAL ONCE
Status: CANCELLED | OUTPATIENT
Start: 2023-06-07 | End: 2023-06-07

## 2023-06-07 RX ORDER — ACETAMINOPHEN 325 MG/1
650 TABLET ORAL
OUTPATIENT
Start: 2023-06-07

## 2023-06-07 RX ORDER — DIPHENHYDRAMINE HCL 25 MG
25 TABLET ORAL ONCE
Status: CANCELLED | OUTPATIENT
Start: 2023-06-07 | End: 2023-06-07

## 2023-06-07 RX ORDER — SODIUM CHLORIDE 9 MG/ML
25 INJECTION, SOLUTION INTRAVENOUS PRN
OUTPATIENT
Start: 2023-06-07

## 2023-06-07 RX ORDER — DIPHENHYDRAMINE HCL 25 MG
25 TABLET ORAL ONCE
Status: COMPLETED | OUTPATIENT
Start: 2023-06-07 | End: 2023-06-07

## 2023-06-07 RX ORDER — EPINEPHRINE 1 MG/ML
0.3 INJECTION, SOLUTION, CONCENTRATE INTRAVENOUS PRN
OUTPATIENT
Start: 2023-06-07

## 2023-06-07 RX ORDER — SODIUM CHLORIDE 9 MG/ML
20 INJECTION, SOLUTION INTRAVENOUS CONTINUOUS
Status: DISCONTINUED | OUTPATIENT
Start: 2023-06-07 | End: 2023-06-08 | Stop reason: HOSPADM

## 2023-06-07 RX ADMIN — SODIUM CHLORIDE 20 ML/HR: 9 INJECTION, SOLUTION INTRAVENOUS at 09:20

## 2023-06-07 RX ADMIN — ACETAMINOPHEN 325 MG: 325 TABLET ORAL at 09:12

## 2023-06-07 RX ADMIN — DIPHENHYDRAMINE HCL 25 MG: 25 TABLET ORAL at 09:12

## 2023-06-07 ASSESSMENT — PAIN SCALES - GENERAL
PAINLEVEL_OUTOF10: 0
PAINLEVEL_OUTOF10: 0

## 2023-06-07 NOTE — PROGRESS NOTES
mg oral   [] Benadryl 50 mg oral  [] Other    Administered Blood via: [x] Peripheral access    [] PICC access    [] Port access    Numbers of units transfused 1 over 3 hours    Monitoring of vital signs and rate changes are documented in flow sheets. Response to treatment:  Well tolerated by patient.     Education:    Indicates understanding      Electronically signed by Porsha Adams RN on 6/7/2023 at 9:27 AM

## 2023-06-08 NOTE — PROGRESS NOTES
Outpatient [place] [address]    Peripheral Lab Draw    NAME:  Carlie Naylor  YOB: 1945  MEDICAL RECORD NUMBER:  [id number]  Episode Date:  6/6/2023    Blood Draw Site: Location:right arm  Site cleansed with Chloroprep Scrub for 30 seconds: Yes  Site cleansed with Alcohol pads: Yes  Labs drawn with: 23 gauge             [x] Butterfly    [] Needle  Labs Obtained: Yes  Number of attempts: 1    Lab Test(s) Ordered: Type and screen    Lab Draw Site:  Redness: No  Bruising: No   Edema: No  Pain: No     Response to treatment:  Well tolerated by patient.       Electronically signed by Kofi Joyner RN on 6/8/2023 at 3:00 PM

## 2023-07-25 ENCOUNTER — HOSPITAL ENCOUNTER (INPATIENT)
Age: 78
LOS: 11 days | Discharge: NURSING, REHAB OR LONG-TERM CARE | End: 2023-08-05
Attending: EMERGENCY MEDICINE | Admitting: INTERNAL MEDICINE
Payer: MEDICARE

## 2023-07-25 ENCOUNTER — APPOINTMENT (OUTPATIENT)
Dept: CT IMAGING | Age: 78
End: 2023-07-25
Payer: MEDICARE

## 2023-07-25 DIAGNOSIS — I95.9 HYPOTENSION, UNSPECIFIED HYPOTENSION TYPE: Primary | ICD-10-CM

## 2023-07-25 DIAGNOSIS — D69.6 THROMBOCYTOPENIA (HCC): ICD-10-CM

## 2023-07-25 DIAGNOSIS — E87.5 HYPERKALEMIA: ICD-10-CM

## 2023-07-25 DIAGNOSIS — T83.511A URINARY TRACT INFECTION ASSOCIATED WITH INDWELLING URETHRAL CATHETER, INITIAL ENCOUNTER (HCC): ICD-10-CM

## 2023-07-25 DIAGNOSIS — D64.9 ANEMIA, UNSPECIFIED TYPE: ICD-10-CM

## 2023-07-25 DIAGNOSIS — Z71.89 GOALS OF CARE, COUNSELING/DISCUSSION: ICD-10-CM

## 2023-07-25 DIAGNOSIS — N39.0 URINARY TRACT INFECTION ASSOCIATED WITH INDWELLING URETHRAL CATHETER, INITIAL ENCOUNTER (HCC): ICD-10-CM

## 2023-07-25 DIAGNOSIS — N17.9 ACUTE KIDNEY INJURY SUPERIMPOSED ON CHRONIC KIDNEY DISEASE (HCC): ICD-10-CM

## 2023-07-25 DIAGNOSIS — R77.8 ELEVATED TROPONIN: ICD-10-CM

## 2023-07-25 DIAGNOSIS — N18.9 ACUTE KIDNEY INJURY SUPERIMPOSED ON CHRONIC KIDNEY DISEASE (HCC): ICD-10-CM

## 2023-07-25 LAB
ABO + RH BLD: NORMAL
ALBUMIN SERPL-MCNC: 3 G/DL (ref 3.4–5)
ALBUMIN/GLOB SERPL: 1.1 {RATIO} (ref 1.1–2.2)
ALP SERPL-CCNC: 49 U/L (ref 40–129)
ALT SERPL-CCNC: 16 U/L (ref 10–40)
ANION GAP SERPL CALCULATED.3IONS-SCNC: 12 MMOL/L (ref 3–16)
APTT BLD: 36.6 SEC (ref 22.7–35.9)
AST SERPL-CCNC: 20 U/L (ref 15–37)
BACTERIA URNS QL MICRO: ABNORMAL /HPF
BASOPHILS # BLD: 0 K/UL (ref 0–0.2)
BASOPHILS NFR BLD: 0.7 %
BILIRUB SERPL-MCNC: 0.5 MG/DL (ref 0–1)
BILIRUB UR QL STRIP.AUTO: NEGATIVE
BLD GP AB SCN SERPL QL: NORMAL
BLOOD BANK DISPENSE STATUS: NORMAL
BLOOD BANK PRODUCT CODE: NORMAL
BPU ID: NORMAL
BUDDING YEAST: PRESENT
BUN SERPL-MCNC: 93 MG/DL (ref 7–20)
CALCIUM SERPL-MCNC: 8.9 MG/DL (ref 8.3–10.6)
CHARACTER UR: ABNORMAL
CHLORIDE SERPL-SCNC: 101 MMOL/L (ref 99–110)
CLARITY UR: ABNORMAL
CO2 SERPL-SCNC: 20 MMOL/L (ref 21–32)
COLOR UR: YELLOW
CREAT SERPL-MCNC: 2.8 MG/DL (ref 0.6–1.2)
CREAT UR-MCNC: 144.8 MG/DL (ref 28–259)
DEPRECATED RDW RBC AUTO: 16.3 % (ref 12.4–15.4)
DESCRIPTION BLOOD BANK: NORMAL
EKG ATRIAL RATE: 73 BPM
EKG DIAGNOSIS: NORMAL
EKG P AXIS: 68 DEGREES
EKG P-R INTERVAL: 156 MS
EKG Q-T INTERVAL: 406 MS
EKG QRS DURATION: 108 MS
EKG QTC CALCULATION (BAZETT): 447 MS
EKG R AXIS: -17 DEGREES
EKG T AXIS: 76 DEGREES
EKG VENTRICULAR RATE: 73 BPM
EOSINOPHIL # BLD: 0.1 K/UL (ref 0–0.6)
EOSINOPHIL NFR BLD: 2.1 %
EOSINOPHIL URNS QL MICRO: NORMAL
EPI CELLS #/AREA URNS AUTO: 2 /HPF (ref 0–5)
GFR SERPLBLD CREATININE-BSD FMLA CKD-EPI: 17 ML/MIN/{1.73_M2}
GLUCOSE SERPL-MCNC: 80 MG/DL (ref 70–99)
GLUCOSE UR STRIP.AUTO-MCNC: NEGATIVE MG/DL
HCT VFR BLD AUTO: 18.2 % (ref 36–48)
HCT VFR BLD AUTO: 22.1 % (ref 36–48)
HEMOCCULT STL QL: NORMAL
HGB BLD-MCNC: 6.1 G/DL (ref 12–16)
HGB BLD-MCNC: 7.4 G/DL (ref 12–16)
HGB UR QL STRIP.AUTO: ABNORMAL
HYALINE CASTS #/AREA URNS AUTO: 4 /LPF (ref 0–8)
INR PPP: 1.17 (ref 0.84–1.16)
KETONES UR STRIP.AUTO-MCNC: NEGATIVE MG/DL
LACTATE BLDV-SCNC: 0.7 MMOL/L (ref 0.4–2)
LEUKOCYTE ESTERASE UR QL STRIP.AUTO: ABNORMAL
LYMPHOCYTES # BLD: 0.7 K/UL (ref 1–5.1)
LYMPHOCYTES NFR BLD: 10.5 %
MCH RBC QN AUTO: 33.9 PG (ref 26–34)
MCHC RBC AUTO-ENTMCNC: 33.3 G/DL (ref 31–36)
MCV RBC AUTO: 101.5 FL (ref 80–100)
MONOCYTES # BLD: 0.3 K/UL (ref 0–1.3)
MONOCYTES NFR BLD: 5 %
NEUTROPHILS # BLD: 5.2 K/UL (ref 1.7–7.7)
NEUTROPHILS NFR BLD: 81.7 %
NITRITE UR QL STRIP.AUTO: NEGATIVE
PH UR STRIP.AUTO: 5 [PH] (ref 5–8)
PLATELET # BLD AUTO: 87 K/UL (ref 135–450)
PLATELET BLD QL SMEAR: ABNORMAL
PMV BLD AUTO: 8 FL (ref 5–10.5)
POTASSIUM SERPL-SCNC: 5.5 MMOL/L (ref 3.5–5.1)
PROT SERPL-MCNC: 5.7 G/DL (ref 6.4–8.2)
PROT UR STRIP.AUTO-MCNC: 30 MG/DL
PROTHROMBIN TIME: 14.9 SEC (ref 11.5–14.8)
RBC # BLD AUTO: 1.79 M/UL (ref 4–5.2)
RBC #/AREA URNS HPF: ABNORMAL /HPF (ref 0–4)
SLIDE REVIEW: ABNORMAL
SODIUM SERPL-SCNC: 133 MMOL/L (ref 136–145)
SODIUM UR-SCNC: <20 MMOL/L
SP GR UR STRIP.AUTO: 1.01 (ref 1–1.03)
T4 FREE SERPL-MCNC: 1.3 NG/DL (ref 0.9–1.8)
TROPONIN, HIGH SENSITIVITY: 112 NG/L (ref 0–14)
TSH SERPL DL<=0.005 MIU/L-ACNC: 10.82 UIU/ML (ref 0.27–4.2)
UA COMPLETE W REFLEX CULTURE PNL UR: YES
UA DIPSTICK W REFLEX MICRO PNL UR: YES
URN SPEC COLLECT METH UR: ABNORMAL
UROBILINOGEN UR STRIP-ACNC: 0.2 E.U./DL
WBC # BLD AUTO: 6.4 K/UL (ref 4–11)
WBC #/AREA URNS AUTO: 206 /HPF (ref 0–5)

## 2023-07-25 PROCEDURE — 6360000002 HC RX W HCPCS

## 2023-07-25 PROCEDURE — 82270 OCCULT BLOOD FECES: CPT

## 2023-07-25 PROCEDURE — P9016 RBC LEUKOCYTES REDUCED: HCPCS

## 2023-07-25 PROCEDURE — 84300 ASSAY OF URINE SODIUM: CPT

## 2023-07-25 PROCEDURE — 87086 URINE CULTURE/COLONY COUNT: CPT

## 2023-07-25 PROCEDURE — 96361 HYDRATE IV INFUSION ADD-ON: CPT

## 2023-07-25 PROCEDURE — 93005 ELECTROCARDIOGRAM TRACING: CPT

## 2023-07-25 PROCEDURE — 36415 COLL VENOUS BLD VENIPUNCTURE: CPT

## 2023-07-25 PROCEDURE — 87181 SC STD AGAR DILUTION PER AGT: CPT

## 2023-07-25 PROCEDURE — 85014 HEMATOCRIT: CPT

## 2023-07-25 PROCEDURE — 93010 ELECTROCARDIOGRAM REPORT: CPT | Performed by: INTERNAL MEDICINE

## 2023-07-25 PROCEDURE — 2580000003 HC RX 258: Performed by: INTERNAL MEDICINE

## 2023-07-25 PROCEDURE — 84443 ASSAY THYROID STIM HORMONE: CPT

## 2023-07-25 PROCEDURE — 86923 COMPATIBILITY TEST ELECTRIC: CPT

## 2023-07-25 PROCEDURE — 30233N1 TRANSFUSION OF NONAUTOLOGOUS RED BLOOD CELLS INTO PERIPHERAL VEIN, PERCUTANEOUS APPROACH: ICD-10-PCS | Performed by: INTERNAL MEDICINE

## 2023-07-25 PROCEDURE — 96374 THER/PROPH/DIAG INJ IV PUSH: CPT

## 2023-07-25 PROCEDURE — 85730 THROMBOPLASTIN TIME PARTIAL: CPT

## 2023-07-25 PROCEDURE — 84484 ASSAY OF TROPONIN QUANT: CPT

## 2023-07-25 PROCEDURE — 86900 BLOOD TYPING SEROLOGIC ABO: CPT

## 2023-07-25 PROCEDURE — 85018 HEMOGLOBIN: CPT

## 2023-07-25 PROCEDURE — 99285 EMERGENCY DEPT VISIT HI MDM: CPT

## 2023-07-25 PROCEDURE — 83605 ASSAY OF LACTIC ACID: CPT

## 2023-07-25 PROCEDURE — 85610 PROTHROMBIN TIME: CPT

## 2023-07-25 PROCEDURE — 80053 COMPREHEN METABOLIC PANEL: CPT

## 2023-07-25 PROCEDURE — 85025 COMPLETE CBC W/AUTO DIFF WBC: CPT

## 2023-07-25 PROCEDURE — 87186 SC STD MICRODIL/AGAR DIL: CPT

## 2023-07-25 PROCEDURE — 87077 CULTURE AEROBIC IDENTIFY: CPT

## 2023-07-25 PROCEDURE — 84439 ASSAY OF FREE THYROXINE: CPT

## 2023-07-25 PROCEDURE — 86850 RBC ANTIBODY SCREEN: CPT

## 2023-07-25 PROCEDURE — 2580000003 HC RX 258

## 2023-07-25 PROCEDURE — 82570 ASSAY OF URINE CREATININE: CPT

## 2023-07-25 PROCEDURE — 96360 HYDRATION IV INFUSION INIT: CPT

## 2023-07-25 PROCEDURE — 6360000002 HC RX W HCPCS: Performed by: INTERNAL MEDICINE

## 2023-07-25 PROCEDURE — 87205 SMEAR GRAM STAIN: CPT

## 2023-07-25 PROCEDURE — 81001 URINALYSIS AUTO W/SCOPE: CPT

## 2023-07-25 PROCEDURE — 74176 CT ABD & PELVIS W/O CONTRAST: CPT

## 2023-07-25 PROCEDURE — 6370000000 HC RX 637 (ALT 250 FOR IP): Performed by: INTERNAL MEDICINE

## 2023-07-25 PROCEDURE — 1200000000 HC SEMI PRIVATE

## 2023-07-25 PROCEDURE — 86901 BLOOD TYPING SEROLOGIC RH(D): CPT

## 2023-07-25 RX ORDER — LORATADINE 10 MG/1
10 TABLET ORAL DAILY
Status: ON HOLD | COMMUNITY
End: 2023-08-05 | Stop reason: HOSPADM

## 2023-07-25 RX ORDER — SODIUM CHLORIDE 0.9 % (FLUSH) 0.9 %
5-40 SYRINGE (ML) INJECTION EVERY 12 HOURS SCHEDULED
Status: DISCONTINUED | OUTPATIENT
Start: 2023-07-25 | End: 2023-08-05 | Stop reason: HOSPADM

## 2023-07-25 RX ORDER — AMLODIPINE BESYLATE 5 MG/1
5 TABLET ORAL NIGHTLY
Status: ON HOLD | COMMUNITY
End: 2023-08-05 | Stop reason: HOSPADM

## 2023-07-25 RX ORDER — AMOXICILLIN 250 MG
1 CAPSULE ORAL DAILY
Status: ON HOLD | COMMUNITY
End: 2023-08-05 | Stop reason: HOSPADM

## 2023-07-25 RX ORDER — HYDRALAZINE HYDROCHLORIDE 50 MG/1
50 TABLET, FILM COATED ORAL 3 TIMES DAILY
Status: ON HOLD | COMMUNITY
End: 2023-08-05 | Stop reason: HOSPADM

## 2023-07-25 RX ORDER — SODIUM CHLORIDE, SODIUM LACTATE, POTASSIUM CHLORIDE, AND CALCIUM CHLORIDE .6; .31; .03; .02 G/100ML; G/100ML; G/100ML; G/100ML
1000 INJECTION, SOLUTION INTRAVENOUS ONCE
Status: COMPLETED | OUTPATIENT
Start: 2023-07-25 | End: 2023-07-25

## 2023-07-25 RX ORDER — FENOFIBRATE 145 MG/1
145 TABLET, COATED ORAL DAILY
Status: ON HOLD | COMMUNITY
End: 2023-08-05 | Stop reason: HOSPADM

## 2023-07-25 RX ORDER — FUROSEMIDE 20 MG/1
20 TABLET ORAL 2 TIMES DAILY
Status: ON HOLD | COMMUNITY
Start: 2023-07-25 | End: 2023-08-05 | Stop reason: HOSPADM

## 2023-07-25 RX ORDER — LORAZEPAM 0.5 MG/1
0.5 TABLET ORAL EVERY 6 HOURS PRN
Status: ON HOLD | COMMUNITY
End: 2023-08-05 | Stop reason: HOSPADM

## 2023-07-25 RX ORDER — MAGNESIUM HYDROXIDE/ALUMINUM HYDROXICE/SIMETHICONE 120; 1200; 1200 MG/30ML; MG/30ML; MG/30ML
30 SUSPENSION ORAL EVERY 6 HOURS PRN
Status: DISCONTINUED | OUTPATIENT
Start: 2023-07-25 | End: 2023-08-05 | Stop reason: HOSPADM

## 2023-07-25 RX ORDER — ASPIRIN 81 MG/1
81 TABLET ORAL DAILY
Status: ON HOLD | COMMUNITY
End: 2023-08-05 | Stop reason: HOSPADM

## 2023-07-25 RX ORDER — BISACODYL 5 MG/1
5 TABLET, DELAYED RELEASE ORAL DAILY PRN
Status: DISCONTINUED | OUTPATIENT
Start: 2023-07-25 | End: 2023-08-05 | Stop reason: HOSPADM

## 2023-07-25 RX ORDER — ESTRADIOL 2 MG/1
2 TABLET ORAL DAILY
Status: ON HOLD | COMMUNITY
End: 2023-08-05 | Stop reason: HOSPADM

## 2023-07-25 RX ORDER — POTASSIUM CHLORIDE 7.45 MG/ML
10 INJECTION INTRAVENOUS PRN
Status: DISCONTINUED | OUTPATIENT
Start: 2023-07-25 | End: 2023-07-25

## 2023-07-25 RX ORDER — FENOFIBRATE 160 MG/1
160 TABLET ORAL DAILY
COMMUNITY
End: 2023-07-25

## 2023-07-25 RX ORDER — SODIUM CHLORIDE 0.9 % (FLUSH) 0.9 %
5-40 SYRINGE (ML) INJECTION PRN
Status: DISCONTINUED | OUTPATIENT
Start: 2023-07-25 | End: 2023-08-05 | Stop reason: HOSPADM

## 2023-07-25 RX ORDER — MORPHINE SULFATE 2 MG/ML
2 INJECTION, SOLUTION INTRAMUSCULAR; INTRAVENOUS ONCE
Status: COMPLETED | OUTPATIENT
Start: 2023-07-25 | End: 2023-07-25

## 2023-07-25 RX ORDER — SODIUM CHLORIDE 9 MG/ML
INJECTION, SOLUTION INTRAVENOUS CONTINUOUS
Status: DISCONTINUED | OUTPATIENT
Start: 2023-07-25 | End: 2023-07-26

## 2023-07-25 RX ORDER — ACETAMINOPHEN 650 MG/1
650 SUPPOSITORY RECTAL EVERY 6 HOURS PRN
Status: DISCONTINUED | OUTPATIENT
Start: 2023-07-25 | End: 2023-08-05 | Stop reason: HOSPADM

## 2023-07-25 RX ORDER — ACETAMINOPHEN 325 MG/1
650 TABLET ORAL EVERY 6 HOURS PRN
Status: DISCONTINUED | OUTPATIENT
Start: 2023-07-25 | End: 2023-08-05 | Stop reason: HOSPADM

## 2023-07-25 RX ORDER — ONDANSETRON 2 MG/ML
4 INJECTION INTRAMUSCULAR; INTRAVENOUS EVERY 6 HOURS PRN
Status: DISCONTINUED | OUTPATIENT
Start: 2023-07-25 | End: 2023-08-05 | Stop reason: HOSPADM

## 2023-07-25 RX ORDER — POTASSIUM CHLORIDE 20 MEQ/1
40 TABLET, EXTENDED RELEASE ORAL PRN
Status: DISCONTINUED | OUTPATIENT
Start: 2023-07-25 | End: 2023-07-25

## 2023-07-25 RX ORDER — HEPARIN SODIUM 5000 [USP'U]/ML
5000 INJECTION, SOLUTION INTRAVENOUS; SUBCUTANEOUS EVERY 8 HOURS SCHEDULED
Status: DISCONTINUED | OUTPATIENT
Start: 2023-07-25 | End: 2023-08-05 | Stop reason: HOSPADM

## 2023-07-25 RX ORDER — ONDANSETRON 4 MG/1
4 TABLET, ORALLY DISINTEGRATING ORAL EVERY 8 HOURS PRN
Status: DISCONTINUED | OUTPATIENT
Start: 2023-07-25 | End: 2023-08-05 | Stop reason: HOSPADM

## 2023-07-25 RX ORDER — SODIUM CHLORIDE 9 MG/ML
INJECTION, SOLUTION INTRAVENOUS PRN
Status: DISCONTINUED | OUTPATIENT
Start: 2023-07-25 | End: 2023-08-05 | Stop reason: HOSPADM

## 2023-07-25 RX ORDER — METAXALONE 400 MG/1
400 TABLET ORAL 3 TIMES DAILY
Status: ON HOLD | COMMUNITY
End: 2023-08-05 | Stop reason: HOSPADM

## 2023-07-25 RX ORDER — SODIUM CHLORIDE 9 MG/ML
INJECTION, SOLUTION INTRAVENOUS PRN
Status: DISCONTINUED | OUTPATIENT
Start: 2023-07-25 | End: 2023-07-27 | Stop reason: SDUPTHER

## 2023-07-25 RX ORDER — ENOXAPARIN SODIUM 100 MG/ML
40 INJECTION SUBCUTANEOUS DAILY
Status: DISCONTINUED | OUTPATIENT
Start: 2023-07-25 | End: 2023-07-25 | Stop reason: ALTCHOICE

## 2023-07-25 RX ORDER — POLYETHYLENE GLYCOL 3350 17 G/17G
17 POWDER, FOR SOLUTION ORAL DAILY
Status: ON HOLD | COMMUNITY
End: 2023-08-05 | Stop reason: HOSPADM

## 2023-07-25 RX ADMIN — HEPARIN SODIUM 5000 UNITS: 5000 INJECTION INTRAVENOUS; SUBCUTANEOUS at 21:12

## 2023-07-25 RX ADMIN — MORPHINE SULFATE 2 MG: 2 INJECTION, SOLUTION INTRAMUSCULAR; INTRAVENOUS at 15:03

## 2023-07-25 RX ADMIN — SODIUM CHLORIDE, POTASSIUM CHLORIDE, SODIUM LACTATE AND CALCIUM CHLORIDE 1000 ML: 600; 310; 30; 20 INJECTION, SOLUTION INTRAVENOUS at 13:09

## 2023-07-25 RX ADMIN — SODIUM ZIRCONIUM CYCLOSILICATE 10 G: 10 POWDER, FOR SUSPENSION ORAL at 18:58

## 2023-07-25 RX ADMIN — SODIUM CHLORIDE: 9 INJECTION, SOLUTION INTRAVENOUS at 17:36

## 2023-07-25 RX ADMIN — CEFTRIAXONE SODIUM 2000 MG: 2 INJECTION, POWDER, FOR SOLUTION INTRAMUSCULAR; INTRAVENOUS at 21:11

## 2023-07-25 NOTE — ED PROVIDER NOTES
ED Attending Attestation Note    This patient was seen by the advanced practice provider. I personally saw the patient and performed a substantive portion of the visit including all aspects of the medical decision making. Briefly, 65 y.o. female presents for evaluation of low Hgb noted in outpatient setting. Of note, the patient was discharged from [facility] 7/6/2023 after admission for acute CVA/metabolic encephalopathy. History includes autoimmune hemolytic anemia. Patient aphasic following recent stroke. Focused exam:   Gen: 65 y.o. female, NAD  HEENT: NCAT. PERRL. EOMI. Abdomen: Soft, nontender, nondistended. No rebound/guarding. EKG interpreted by myself. Rate: normal  Rhythm: NSR  Axis: normal  Intervals: within normal limits  ST Segments: no acute abnormality  T waves: no acute abnormality  Comparison: no prior   Impression: NSR with no acute abnormality     MDM:   This is a 67-year-old female, recently status post acute ischemic stroke, with history of autoimmune hemolytic anemia, presenting with outpatient hemoglobin of 6.2. The anemia is redemonstrated in the emergency department, hemoglobin 6.1. Patient to be transfused 1 unit packed red blood cells. Renal panel mild hyperkalemia potassium 5.5. No peaked T waves on EKG. BUN and creatinine are both elevated. DENISE. Likely prerenal.  IV fluids. Subclinical hypothyroidism. High-sensitivity troponin elevated at 112. Will trend. In the absence of acute ischemic abnormality on EKG will defer heparin at this time. CRITICAL CARE  I personally saw the patient and independently provided 31 minutes of non-concurrent critical care out of the total shared critical care time provided. This excludes seperately billable procedures.  Critical care time was provided for anemia requiring transfusion, hyperkalemia, elevated troponin, all that required close evaluation and/or intervention with concern for potential patient decompensation. For further details of the patient's emergency department visit, please see the advanced practice provider's documentation. Uil Vela MD     This report has been produced using speech recognition software and may contain errors related to that system including errors in grammar, punctuation, and spelling, as well as words and phrases that may be inappropriate. If there are any questions or concerns please feel free to contact the dictating provider for clarification.        Uli Vela MD  07/25/23 1395

## 2023-07-25 NOTE — H&P
V2.0  History and Physical      Name:  Ron Forbes /Age/Sex: 1945  (65 y.o. female)   MRN & CSN:  [id number] & [id number] Encounter Date/Time: 2023 5:23 PM EDT   Location:  [address] [place] [address] PCP: Jose Guadalupe Glasgow MD       Hospital Day: 1    Assessment and Plan:   Ron Forbes is a 65 y.o. female with a Significant PMH as below who presented to ED from [facility] for low H&H      Acute anemia with Hemoccult negative requiring PRBC transfusion  DENISE on CKD stage IIIb  Mild hyperkalemia  Acute thrombocytopenia of unclear etiology  Hypotension likely due to anemia and dehydration  Clinical dehydration  History of hypertension  Hyperlipidemia  Hypothyroidism  Recent acute CVA  Elevated troponin in the setting of DENISE    Plan:  Admit inpatient on telemetry to PCU  1 unit PRBC has been ordered by the ED  Serial CBC and transfuse if hemoglobin less than 7  GI consulted  Hmatology-oncology consulted  We will send urine lites, creatinine and eosinophils and monitor renal function  Gentle IV fluids  Nephrology consult  Resume other home medication/ECF medication but hold nephrotoxic agents  Will give Lokelma  Labs in a.m. Comment: Please note this report has been produced using speech recognition software and may contain errors related to that system including errors in grammar, punctuation, and spelling, as well as words and phrases that may be inappropriate. If there are any questions or concerns please feel free to contact the dictating provider for clarification. Disposition:   Current Living situation: Home  Expected Disposition: ECF  Estimated D/C: in 2-3 days    Diet ADULT DIET; Regular; 3 carb choices (45 gm/meal);  Low Sodium (2 gm)   DVT Prophylaxis [x] Lovenox, []  Heparin, [] SCDs, [] Ambulation,  [] Eliquis, [] Xarelto, [] Coumadin   Code Status Full Code   Surrogate Decision Maker/ POA ED     Personally reviewed Lab Studies and Imaging     Discussed management of the case with

## 2023-07-25 NOTE — ED NOTES
Pt stating her back hurts, pt will not answer pain level assessment.       Adan Short RN  07/25/23 8836

## 2023-07-25 NOTE — ED NOTES
Pt brief changed, perineal care peformed, pt was covered in stool. Repositioned in bed. Redness and small wound to the left upper pelvic area, in the crease, buttocks area red, no open sores noted.       Nasra Padilla RN  07/25/23 8963

## 2023-07-25 NOTE — ED NOTES
ED TO INPATIENT SBAR HANDOFF    Patient Name: Ron Forbes   :  1945  65 y.o. MRN:  [id number]  Preferred Name  [address]  ED Room #:  789/V-82  Family/Caregiver Present yes  Restraints no   Sitter no   Sepsis Risk Score Sepsis Risk Score: 2.32    Situation  Code Status: Full Code No additional code details. Allergies: Nsaids  Weight: No data found. Arrived from: nursing home  Chief Complaint:   Chief Complaint   Patient presents with    Abnormal Lab     Sent via [facility], coming from dr. Mony York, sent to ED for low hemoglobin of 6.2. family reports pt had a stroke 3 weeks ago, denies residuals. Pt oriented to self and place only. Pt difficult to ask questions to, very lethargic, responds to painful stimuli. Per  at bedside, this has been her norm since the stroke 3 weeks ago. Hospital Problem/Diagnosis:  Principal Problem:    DENISE (acute kidney injury) ([address])  Resolved Problems:    * No resolved hospital problems. *    Imaging:   CT ABDOMEN PELVIS WO CONTRAST Additional Contrast? None   Final Result   1. No acute findings in the abdomen or pelvis. 2. Colonic diverticulosis. 3. 2.1 cm left adrenal nodule which contains macroscopic fat consistent with   a myelolipoma. No imaging follow-up is recommended. 4. Fat containing umbilical hernia.            Abnormal labs:   Abnormal Labs Reviewed   COMPREHENSIVE METABOLIC PANEL W/ REFLEX TO MG FOR LOW K - Abnormal; Notable for the following components:       Result Value    Sodium 133 (*)     Potassium reflex Magnesium 5.5 (*)     CO2 20 (*)     BUN 93 (*)     Creatinine 2.8 (*)     Est, Glom Filt Rate 17 (*)     Total Protein 5.7 (*)     Albumin 3.0 (*)     All other components within normal limits    Narrative:     Nikita Antunez tel. [phone],  Chemistry results called to and read back by Louis Bales RN, 2023  14:16, by Penelope Sargent   CBC WITH AUTO DIFFERENTIAL - Abnormal; Notable for the following components:    RBC 1.79 MEWS Score: 1  Level of Consciousness: Alert (0)   Vitals:    07/25/23 1602 07/25/23 1615 07/25/23 1700 07/25/23 1715   BP: (!) 126/47 (!) 119/56 (!) 133/55 (!) 104/45   Pulse: 97 79 73 73   Resp: 16 15 15 17   Temp:       TempSrc:       SpO2: 97% 99% 100% 100%     FiO2 (%): room air   O2 Flow Rate: O2 Device: None (Room air)    Cardiac Rhythm: Cardiac Rhythm: Sinus rhythm  Pain Assessment:  [] Verbal [] Pearlene Spruce Scale  Pain Scale:    Last documented pain score (0-10 scale)    Last documented pain medication administered: SEE MAR   Mental Status: disoriented  Orientation Level: Orientation Level: Oriented to person  NIH Score:    C-SSRS: Risk of Suicide: No Risk  Bedside swallow:    Mat Coma Scale (GCS): Mat Coma Scale  Eye Opening: To pain  Best Verbal Response: Confused  Best Motor Response: Localizes pain  Mat Coma Scale Score: 11  Active LDA's:   Peripheral IV 07/25/23 Right Forearm (Active)   Site Assessment Clean, dry & intact 07/25/23 1308   Line Status Blood return noted 07/25/23 [address] [place] changed 07/25/23 1308   Phlebitis Assessment No symptoms 07/25/23 1308   Infiltration Assessment 0 07/25/23 1308       Peripheral IV 07/25/23 Right Antecubital (Active)   Site Assessment Clean, dry & intact 07/25/23 1344   Line Status Blood return noted 07/25/23 [facility] changed 07/25/23 1344   Phlebitis Assessment No symptoms 07/25/23 1344   Infiltration Assessment 0 07/25/23 1344     PO Status: Regular  Pertinent or High Risk Medications/Drips: no   If Yes, please provide details: N/A  Pending Blood Product Administration: no       You may also review the ED PT Care Timeline found under the Summary Nursing Index tab. Recommendation    Pending orders [address] for Discharge (if known): Additional Comments: Pt extremely lethargic, have to sternal rub to get answers, or to talk. Pt oriented to self.  Only other words patient has said to this RN is asking for her

## 2023-07-25 NOTE — CONSENT
Informed Consent for Blood Component Transfusion Note    I have discussed with the patient and spouse KORIN  the rationale for blood component transfusion; its benefits in treating or preventing fatigue, organ damage, or death; and its risk which includes mild transfusion reactions, rare risk of blood borne infection, or more serious but rare reactions. I have discussed the alternatives to transfusion, including the risk and consequences of not receiving transfusion. The patient and spouse had an opportunity to ask questions and had agreed to proceed with transfusion of blood components.   Witnessed by nurse [facility]  Electronically signed by MIKI Richardson CNP on 7/25/23 at 12:52 PM EDT

## 2023-07-25 NOTE — PROGRESS NOTES
Medication Reconciliation    List of medications patient is currently taking is complete. Source of information: 1. Conversation with patient at bedside                                      2. EPIC records                                       3. Medication records from [address] [place] [address]     Notes regarding home medications:   1. Patient received all of her morning home medications prior to arrival to the emergency department today.     Donetta Gowers, [facility], PharmD, BCPS  7/25/2023 5:19 PM

## 2023-07-25 NOTE — PROGRESS NOTES
Pharmacy has discontinued the Potassium Protocol per hospital policy\  Poor renal function  Each dose needs to be ordered by  the physician.

## 2023-07-25 NOTE — ED NOTES
Critical BUN CREAT labs reported to [facility] at this time.       Rachel Cooper RN  07/25/23 0346

## 2023-07-25 NOTE — ED NOTES
Abnormal hemoglobin and hematocrit reported to MD [facility], RN  07/25/23 0758

## 2023-07-25 NOTE — PROGRESS NOTES
Pharmacy has changed the Lovenox to heparin per hospital policy  CrCl = 21    Heparin 5000 units sq  q 8 h

## 2023-07-25 NOTE — ED PROVIDER NOTES
[address] [place]        Pt Name: Wilma Pierre  MRN: [id number]  [address] 1945  Date of evaluation: 7/25/2023  Provider: MIKI Gillis - YENI  PCP: Reid Grossman MD  Note Started: 12:58 PM EDT 7/25/23       I have seen and evaluated this patient with my supervising physician Abrahan Lester MD.      [address]       Chief Complaint   Patient presents with    Abnormal Lab     Sent via [facility], coming from dr. Elin Austin, sent to ED for low hemoglobin of 6.2. family reports pt had a stroke 3 weeks ago, denies residuals. Pt oriented to self and place only. Pt difficult to ask questions to, very lethargic, responds to painful stimuli. Per  at bedside, this has been her norm since the stroke 3 weeks ago. HISTORY OF PRESENT ILLNESS: 1 or more Elements     History From: pt,  at bedside, snf paperwork    Limitations to history : cva        Chief Complaint:above    Wilma Pierre is a 65 y.o. female who presents to ed from Hematology for low Hgb. Patient with history of hemolytic anemia. Being managed with Dr. Gabriela Fried. Had a stroke 3 weeks ago. She is at her mental baseline. This is normal per  at the bedside. The patient had labs drawn which showed a hemoglobin in the sixes. At this time assessment patient is pale. She is hypotensive. She is in acute distress likely has hypertension. Limited HPI and ROS secondary to this hypertension. HPI with assistance from  at bedside. The patient denies any pain or dark stools. Nursing Notes were all reviewed and agreed with or any disagreements were addressed in the HPI. REVIEW OF SYSTEMS :      Review of Systems   Unable to perform ROS: Acuity of condition     Positives and Pertinent negatives as per HPI.      SURGICAL HISTORY     Past Surgical History:   Procedure Laterality Date    APPENDECTOMY      CHOLECYSTECTOMY      CT BONE MARROW BIOPSY The  paged the [address] on call. Records Reviewed (External and Source)     [facility] notes as in EMR  PCP notes as in EMR    SNF paperwork at bedside    CC/HPI Summary, DDx, ED Course, and Reassessment:     64 yo comes ot ed in acute distress hypotensive and low H&H    Bilateral large-bore IVs placed. The patient is pale. Patient/ consented for blood transfusion. 1 unit of PRBCs ordered. In the meantime the patient was given 1 L of IV fluid with a rapid infusion. Labs are sent. UA with infection. IV rocephin ordered  CMP with worsening kidney function. Mild hyperkalemia which we expect to improve with a fluid bolus. Defer further work-up and management of that to admitting. CBC with significant anemia. Also new thrombocytopenia. Type and screen, coags reviewed. Occult blood negative. Lactate negative. Troponin 112. Suspect demand ischemia. TSH and free T4 as above. Consistent with patient's chronic diseases. CT scan as above. Patient will be fluid and blood product resuscitated. She requires admission for further management. As such findings and plan were discussed with the patient, , hematology, GI and hospitalist.  Patient be admitted to hospitalist service. Goals of care discussed with patient and . Prefers full code at this time    Disposition Considerations (tests considered but not done, Admit vs D/C, Shared Decision Making, Pt Expectation of Test or Tx.): above       I am the Primary Clinician of Record. FINAL IMPRESSION      1. Hypotension, unspecified hypotension type    2. Anemia, unspecified type    3. Acute kidney injury superimposed on chronic kidney disease ([address])    4. Thrombocytopenia (HCC)    5. Elevated troponin    6. Hyperkalemia    7. Goals of care, counseling/discussion    8.  Urinary tract infection associated with indwelling urethral catheter, initial encounter [facility])          DISPOSITION/PLAN     DISPOSITION Admitted 07/25/2023

## 2023-07-26 LAB
ALBUMIN SERPL-MCNC: 2.7 G/DL (ref 3.4–5)
ALBUMIN SERPL-MCNC: 2.9 G/DL (ref 3.4–5)
ALBUMIN/GLOB SERPL: 1.2 {RATIO} (ref 1.1–2.2)
ALP SERPL-CCNC: 45 U/L (ref 40–129)
ALP SERPL-CCNC: 48 U/L (ref 40–129)
ALT SERPL-CCNC: 14 U/L (ref 10–40)
ALT SERPL-CCNC: 16 U/L (ref 10–40)
ANION GAP SERPL CALCULATED.3IONS-SCNC: 13 MMOL/L (ref 3–16)
ANION GAP SERPL CALCULATED.3IONS-SCNC: 13 MMOL/L (ref 3–16)
ANION GAP SERPL CALCULATED.3IONS-SCNC: 15 MMOL/L (ref 3–16)
AST SERPL-CCNC: 19 U/L (ref 15–37)
AST SERPL-CCNC: 29 U/L (ref 15–37)
BACTERIA URNS QL MICRO: ABNORMAL /HPF
BASOPHILS # BLD: 0 K/UL (ref 0–0.2)
BASOPHILS NFR BLD: 0.5 %
BILIRUB DIRECT SERPL-MCNC: <0.2 MG/DL (ref 0–0.3)
BILIRUB INDIRECT SERPL-MCNC: ABNORMAL MG/DL (ref 0–1)
BILIRUB SERPL-MCNC: 0.4 MG/DL (ref 0–1)
BILIRUB SERPL-MCNC: 0.4 MG/DL (ref 0–1)
BILIRUB UR QL STRIP.AUTO: NEGATIVE
BUN SERPL-MCNC: 79 MG/DL (ref 7–20)
BUN SERPL-MCNC: 86 MG/DL (ref 7–20)
BUN SERPL-MCNC: 88 MG/DL (ref 7–20)
CALCIUM SERPL-MCNC: 8.7 MG/DL (ref 8.3–10.6)
CALCIUM SERPL-MCNC: 8.8 MG/DL (ref 8.3–10.6)
CALCIUM SERPL-MCNC: 9.1 MG/DL (ref 8.3–10.6)
CHLORIDE SERPL-SCNC: 105 MMOL/L (ref 99–110)
CHLORIDE SERPL-SCNC: 105 MMOL/L (ref 99–110)
CHLORIDE SERPL-SCNC: 108 MMOL/L (ref 99–110)
CHLORIDE UR-SCNC: <20 MMOL/L
CK SERPL-CCNC: 51 U/L (ref 26–192)
CLARITY UR: CLEAR
CO2 SERPL-SCNC: 16 MMOL/L (ref 21–32)
CO2 SERPL-SCNC: 18 MMOL/L (ref 21–32)
CO2 SERPL-SCNC: 19 MMOL/L (ref 21–32)
COLOR UR: YELLOW
CREAT SERPL-MCNC: 2.7 MG/DL (ref 0.6–1.2)
DEPRECATED RDW RBC AUTO: 19.2 % (ref 12.4–15.4)
DEPRECATED RDW RBC AUTO: 19.5 % (ref 12.4–15.4)
EOSINOPHIL # BLD: 0.1 K/UL (ref 0–0.6)
EOSINOPHIL NFR BLD: 2.1 %
EPI CELLS #/AREA URNS AUTO: 2 /HPF (ref 0–5)
FERRITIN SERPL IA-MCNC: 2677 NG/ML (ref 15–150)
GFR SERPLBLD CREATININE-BSD FMLA CKD-EPI: 17 ML/MIN/{1.73_M2}
GLUCOSE BLD-MCNC: 72 MG/DL (ref 70–99)
GLUCOSE BLD-MCNC: 79 MG/DL (ref 70–99)
GLUCOSE BLD-MCNC: 85 MG/DL (ref 70–99)
GLUCOSE SERPL-MCNC: 62 MG/DL (ref 70–99)
GLUCOSE SERPL-MCNC: 63 MG/DL (ref 70–99)
GLUCOSE SERPL-MCNC: 66 MG/DL (ref 70–99)
GLUCOSE UR STRIP.AUTO-MCNC: NEGATIVE MG/DL
HCT VFR BLD AUTO: 20.1 % (ref 36–48)
HCT VFR BLD AUTO: 20.2 % (ref 36–48)
HCT VFR BLD AUTO: 24.6 % (ref 36–48)
HGB BLD-MCNC: 6.7 G/DL (ref 12–16)
HGB BLD-MCNC: 6.8 G/DL (ref 12–16)
HGB BLD-MCNC: 8.3 G/DL (ref 12–16)
HGB UR QL STRIP.AUTO: NEGATIVE
HYALINE CASTS #/AREA URNS AUTO: 2 /LPF (ref 0–8)
IRON SATN MFR SERPL: 44 % (ref 15–50)
IRON SERPL-MCNC: 128 UG/DL (ref 37–145)
KETONES UR STRIP.AUTO-MCNC: ABNORMAL MG/DL
LEUKOCYTE ESTERASE UR QL STRIP.AUTO: ABNORMAL
LYMPHOCYTES # BLD: 0.5 K/UL (ref 1–5.1)
LYMPHOCYTES NFR BLD: 10.7 %
MCH RBC QN AUTO: 32.3 PG (ref 26–34)
MCH RBC QN AUTO: 32.7 PG (ref 26–34)
MCHC RBC AUTO-ENTMCNC: 33.1 G/DL (ref 31–36)
MCHC RBC AUTO-ENTMCNC: 33.7 G/DL (ref 31–36)
MCV RBC AUTO: 96.9 FL (ref 80–100)
MCV RBC AUTO: 97.5 FL (ref 80–100)
MONOCYTES # BLD: 0.3 K/UL (ref 0–1.3)
MONOCYTES NFR BLD: 5 %
NEUTROPHILS # BLD: 4.2 K/UL (ref 1.7–7.7)
NEUTROPHILS NFR BLD: 81.7 %
NITRITE UR QL STRIP.AUTO: NEGATIVE
NT-PROBNP SERPL-MCNC: 1453 PG/ML (ref 0–449)
PERFORMED ON: NORMAL
PH UR STRIP.AUTO: 5 [PH] (ref 5–8)
PLATELET # BLD AUTO: 76 K/UL (ref 135–450)
PLATELET # BLD AUTO: 93 K/UL (ref 135–450)
PMV BLD AUTO: 8 FL (ref 5–10.5)
PMV BLD AUTO: 8.7 FL (ref 5–10.5)
POTASSIUM SERPL-SCNC: 5.3 MMOL/L (ref 3.5–5.1)
POTASSIUM SERPL-SCNC: 5.4 MMOL/L (ref 3.5–5.1)
POTASSIUM SERPL-SCNC: 5.9 MMOL/L (ref 3.5–5.1)
POTASSIUM UR-SCNC: 22 MMOL/L
PROT SERPL-MCNC: 5.4 G/DL (ref 6.4–8.2)
PROT SERPL-MCNC: 5.8 G/DL (ref 6.4–8.2)
PROT UR STRIP.AUTO-MCNC: ABNORMAL MG/DL
PROT UR-MCNC: 0.01 G/DL
PROT UR-MCNC: 13 MG/DL
RBC # BLD AUTO: 2.07 M/UL (ref 4–5.2)
RBC # BLD AUTO: 2.07 M/UL (ref 4–5.2)
RBC CLUMPS #/AREA URNS AUTO: 1 /HPF (ref 0–4)
SODIUM SERPL-SCNC: 136 MMOL/L (ref 136–145)
SODIUM SERPL-SCNC: 137 MMOL/L (ref 136–145)
SODIUM SERPL-SCNC: 139 MMOL/L (ref 136–145)
SODIUM UR-SCNC: <20 MMOL/L
SP GR UR STRIP.AUTO: 1.01 (ref 1–1.03)
TIBC SERPL-MCNC: 291 UG/DL (ref 260–445)
UA DIPSTICK W REFLEX MICRO PNL UR: YES
URN SPEC COLLECT METH UR: ABNORMAL
UROBILINOGEN UR STRIP-ACNC: 0.2 E.U./DL
WBC # BLD AUTO: 5.1 K/UL (ref 4–11)
WBC # BLD AUTO: 5.5 K/UL (ref 4–11)
WBC #/AREA URNS AUTO: 30 /HPF (ref 0–5)

## 2023-07-26 PROCEDURE — 6360000002 HC RX W HCPCS: Performed by: INTERNAL MEDICINE

## 2023-07-26 PROCEDURE — 97163 PT EVAL HIGH COMPLEX 45 MIN: CPT

## 2023-07-26 PROCEDURE — 84133 ASSAY OF URINE POTASSIUM: CPT

## 2023-07-26 PROCEDURE — 85027 COMPLETE CBC AUTOMATED: CPT

## 2023-07-26 PROCEDURE — 84156 ASSAY OF PROTEIN URINE: CPT

## 2023-07-26 PROCEDURE — 82436 ASSAY OF URINE CHLORIDE: CPT

## 2023-07-26 PROCEDURE — 85025 COMPLETE CBC W/AUTO DIFF WBC: CPT

## 2023-07-26 PROCEDURE — 2580000003 HC RX 258: Performed by: INTERNAL MEDICINE

## 2023-07-26 PROCEDURE — 2500000003 HC RX 250 WO HCPCS: Performed by: INTERNAL MEDICINE

## 2023-07-26 PROCEDURE — 84300 ASSAY OF URINE SODIUM: CPT

## 2023-07-26 PROCEDURE — 36430 TRANSFUSION BLD/BLD COMPNT: CPT

## 2023-07-26 PROCEDURE — 97166 OT EVAL MOD COMPLEX 45 MIN: CPT

## 2023-07-26 PROCEDURE — 81001 URINALYSIS AUTO W/SCOPE: CPT

## 2023-07-26 PROCEDURE — 83880 ASSAY OF NATRIURETIC PEPTIDE: CPT

## 2023-07-26 PROCEDURE — 97530 THERAPEUTIC ACTIVITIES: CPT

## 2023-07-26 PROCEDURE — 84132 ASSAY OF SERUM POTASSIUM: CPT

## 2023-07-26 PROCEDURE — 97535 SELF CARE MNGMENT TRAINING: CPT

## 2023-07-26 PROCEDURE — 83550 IRON BINDING TEST: CPT

## 2023-07-26 PROCEDURE — 82728 ASSAY OF FERRITIN: CPT

## 2023-07-26 PROCEDURE — 82550 ASSAY OF CK (CPK): CPT

## 2023-07-26 PROCEDURE — 36415 COLL VENOUS BLD VENIPUNCTURE: CPT

## 2023-07-26 PROCEDURE — 83540 ASSAY OF IRON: CPT

## 2023-07-26 PROCEDURE — 2060000000 HC ICU INTERMEDIATE R&B

## 2023-07-26 PROCEDURE — 85018 HEMOGLOBIN: CPT

## 2023-07-26 PROCEDURE — 80053 COMPREHEN METABOLIC PANEL: CPT

## 2023-07-26 PROCEDURE — 94760 N-INVAS EAR/PLS OXIMETRY 1: CPT

## 2023-07-26 PROCEDURE — 85014 HEMATOCRIT: CPT

## 2023-07-26 PROCEDURE — 84166 PROTEIN E-PHORESIS/URINE/CSF: CPT

## 2023-07-26 RX ORDER — SODIUM CHLORIDE 9 MG/ML
INJECTION, SOLUTION INTRAVENOUS PRN
Status: DISCONTINUED | OUTPATIENT
Start: 2023-07-26 | End: 2023-07-27 | Stop reason: SDUPTHER

## 2023-07-26 RX ADMIN — EPOETIN ALFA-EPBX 40000 UNITS: 40000 INJECTION, SOLUTION INTRAVENOUS; SUBCUTANEOUS at 10:38

## 2023-07-26 RX ADMIN — CEFEPIME 1000 MG: 1 INJECTION, POWDER, FOR SOLUTION INTRAMUSCULAR; INTRAVENOUS at 18:24

## 2023-07-26 RX ADMIN — SODIUM CHLORIDE: 9 INJECTION, SOLUTION INTRAVENOUS at 07:47

## 2023-07-26 RX ADMIN — HEPARIN SODIUM 5000 UNITS: 5000 INJECTION INTRAVENOUS; SUBCUTANEOUS at 05:06

## 2023-07-26 RX ADMIN — SODIUM BICARBONATE: 84 INJECTION, SOLUTION INTRAVENOUS at 12:46

## 2023-07-26 RX ADMIN — HEPARIN SODIUM 5000 UNITS: 5000 INJECTION INTRAVENOUS; SUBCUTANEOUS at 15:57

## 2023-07-26 RX ADMIN — HEPARIN SODIUM 5000 UNITS: 5000 INJECTION INTRAVENOUS; SUBCUTANEOUS at 22:14

## 2023-07-26 NOTE — CONSULTS
Oncology Hematology Care    Consult Note      Requesting Physician: Eva  CHIEF COMPLAINT: MS changes\\ failure to thrive      HISTORY OF PRESENT ILLNESS:      Ms. Severiano Ba  is a 65 y.o. female we are seeing in consultation for Known long standing hemolytic anemia  THe pt followed with dr Kathy Shin for years and had an extensive heme workup in the past and in the last year she had a marrow -the hemolysis isnot necessarily charcterized as a type but basically she has been maintained of procrit weekly for years and periodic iv iron  The pt is a retired nurse-she had a normal baseline until 3 weeks ago where she had a stroke and was admitted [email or web address]I dont have the deail\s of that admit but the pt was discharged to a rehab  The pt ran into a circular problem-where PT OT would not work with her if her hemoglobin was under 7 but she also has beeen unable to get her procrit-thus her  states seh has been laying in bed essentially doing nothing-seh was walking with a walker prior to dc to rehab  I saw her in the office yesterday and she has much declined from when I saw her pre stroke  She mainly is not participating well in conversations and not having good insight into her situation -the pts  does think she has changed her personality  She had a hgb of 6 in the office yesterday  Apparently the pt was to be dc from rehab July 31?   did not really know waht the plan was from there -  But obviously could not take care of her -  She seems to be negleting her left side a bit       Past Medical History:    Chronic hemolytic anemia       Diagnosis Date    Anemia     Arthritis     Chronic kidney disease     H/O bladder problems     Hyperlipidemia     Hypertension     Thyroid disease      Past Surgical History:        Procedure Laterality Date    APPENDECTOMY      CHOLECYSTECTOMY      CT BONE MARROW BIOPSY  12/1/2022    CT BONE MARROW BIOPSY [facility] CT    DILATION AND CURETTAGE OF UTERUS hydronephrosis. GI/Bowel: There is no bowel dilatation or bowel wall thickening. There are colonic diverticula. There is a small hiatal hernia. The stomach is otherwise unremarkable. Pelvis: Bladder is nondistended contains a Robbins catheter. There has been a hysterectomy. There is no abnormal adnexal mass. Peritoneum/Retroperitoneum: No evidence of lymphadenopathy. Aorta is normal in caliber. No fat stranding, free fluid, free air or focal fluid collection is identified. Bones/Soft Tissues: No fracture or destructive bone lesion is identified. There is a fat containing umbilical hernia. 1. No acute findings in the abdomen or pelvis. 2. Colonic diverticulosis. 3. 2.1 cm left adrenal nodule which contains macroscopic fat consistent with a myelolipoma. No imaging follow-up is recommended. 4. Fat containing umbilical hernia.          Problem List  Patient Active Problem List   Diagnosis    Acquired autoimmune hemolytic anemia (HCC)    Anemia in CKD (chronic kidney disease)    Chronic kidney disease, stage III (moderate) (HCC)    DENISE (acute kidney injury) ([address])                                                                Problem List  Patient Active Problem List   Diagnosis    Acquired autoimmune hemolytic anemia ([address])    Anemia in CKD (chronic kidney disease)    Chronic kidney disease, stage III (moderate) (HCC)    DENISE (acute kidney injury) ([address])       IMPRESSION/RECOMMENDATIONS:  Chronic hemolytic anemia  Prior to her stroke she maintained on weekly procrit and periodic iv iron -this generally has kept her close to 9-10  However duie to stroke and being in rehab she has not had procrit in weeks now and I do think her hgb drop is probably this over a gi bleed  She had a marrow in the last year to rule out m ds  The problem that the  told me is they wont try to do rehab if hgb under 7 but if seh cant get her procrit-she wont and im not sure rehab will pay for procrit  My main concern is what should we

## 2023-07-26 NOTE — PLAN OF CARE
Problem: Discharge Planning  Goal: Discharge to home or other facility with appropriate resources  Outcome: Progressing  Flowsheets (Taken 7/25/2023 1820 by Siri Duffy RN)  Discharge to home or other facility with appropriate resources: Identify barriers to discharge with patient and caregiver     Problem: Skin/Tissue Integrity  Goal: Absence of new skin breakdown  Description: 1. Monitor for areas of redness and/or skin breakdown  2. Assess vascular access sites hourly  3. Every 4-6 hours minimum:  Change oxygen saturation probe site  4. Every 4-6 hours:  If on nasal continuous positive airway pressure, respiratory therapy assess nares and determine need for appliance change or resting period.   Outcome: Progressing     Problem: Safety - Adult  Goal: Free from fall injury  Outcome: Progressing

## 2023-07-26 NOTE — CONSULTS
GASTROENTEROLOGY INPATIENT CONSULTATION        IDENTIFYING DATA/REASON FOR CONSULTATION   PATIENT:  Priscilla العراقي  MRN:  [id number]  ADMIT DATE: 7/25/2023  TIME OF EVALUATION: 7/26/2023 12:15 PM  HOSPITAL STAY:   LOS: 1 day     REASON FOR CONSULTATION:  anemia    HISTORY OF PRESENT ILLNESS   Priscilla العراقي is a 65 y.o. female with a PMH of recent CVA, hemolytic anemia, CKD, HTN, HLD, hypothyroidism who presented on 7/25/2023 from rehab facility with low hemoglobin. Pt minimally verbal, unable to provide a history. She had a recent stroke 3 weeks ago and was treated at [facility].  She was discharged to a rehab facility and according to notes she has been unable to participate in PT/OT due to low hemoglobin level. Pt has a history of hemolytic anemia which has been managed in the past on weekly procrit and periodic IV iron. She unfortunately was unable to get procrit during her stay at rehab. Her blood work here showed a hgb of 6.1 with MCV of 101.5. Fecal occult stool test was negative. She has been transfused 1uPRBC. She has been seen by Hematology and restarted on retacrit. Prior Endoscopic Evaluations:  EGD/colonoscopy 5/2014 with Dr. Marlen Darby  Normal duodenum, bx neg for celiac disease  Diverticulosis  Poor prep right side colon, the visualized portion of the colon showed no polyps.       PAST MEDICAL, SURGICAL, FAMILY, and SOCIAL HISTORY     Past Medical History:   Diagnosis Date    Anemia     Arthritis     Chronic kidney disease     H/O bladder problems     Hyperlipidemia     Hypertension     Thyroid disease      Past Surgical History:   Procedure Laterality Date    APPENDECTOMY      CHOLECYSTECTOMY      CT BONE MARROW BIOPSY  12/1/2022    CT BONE MARROW BIOPSY [facility] CT    DILATION AND CURETTAGE OF UTERUS      HYSTERECTOMY (CERVIX STATUS UNKNOWN)      IR BIOPSY PERC SUPERF BONE  12/1/2022    IR BIOPSY PERC SUPERF BONE [facility] SPECIAL PROCEDURES    KNEE SURGERY       Family History   Problem

## 2023-07-26 NOTE — FLOWSHEET NOTE
Pt is currently receiving a unit of PRBCs. After the first 15 minutes she is tolerating well. No signs of reaction to blood transfusion.

## 2023-07-26 NOTE — PROGRESS NOTES
Physical Therapy  Facility/Department: [address] PROGRESSIVE CARE  Physical Therapy Initial Assessment/Cotx with OT     Name: Julio Adams  : 1945  MRN: [id number]  Date of Service: 2023    Discharge Recommendations:  Patient would benefit from continued therapy after discharge, [address] (skilled 3-5 x wk therapy)   PT Equipment Recommendations  Other: defer to facility      Patient Diagnosis(es): The primary encounter diagnosis was Hypotension, unspecified hypotension type. Diagnoses of Anemia, unspecified type, Acute kidney injury superimposed on chronic kidney disease ([address]), Thrombocytopenia ([address]), Elevated troponin, Hyperkalemia, Goals of care, counseling/discussion, and Urinary tract infection associated with indwelling urethral catheter, initial encounter [facility]) were also pertinent to this visit. Past Medical History:  has a past medical history of Anemia, Arthritis, Chronic kidney disease, H/O bladder problems, Hyperlipidemia, Hypertension, and Thyroid disease. Past Surgical History:  has a past surgical history that includes knee surgery; Hysterectomy; Dilation and curettage of uterus; Appendectomy; Cholecystectomy; IR BIOPSY BONE MARROW (2022); and CT BIOPSY BONE MARROW (2022). Assessment   Body Structures, Functions, Activity Limitations Requiring Skilled Therapeutic Intervention: Decreased functional mobility   Assessment: Pt presents after admission, per H&P, \"69 y.o. female presents for evaluation of low Hgb noted in outpatient setting. Of note, the patient was discharged from [facility] 2023 after admission for acute CVA/metabolic encephalopathy. History includes autoimmune hemolytic anemia. Patient aphasic following recent stroke. \"  Admitted with \" Acute anemia; DENISE on CKD, Acute thrombocytopenia of unclear etiology; Hypotension likely due to anemia and dehydration. \" Prior to admission, pt was at [facility] facility per chart.

## 2023-07-26 NOTE — PROGRESS NOTES
Pt resting in bed comfortably, VSS. No needs expressed at this time. Call light within reach. Bed alarm on for fall risk. Assisted to turn q2h.

## 2023-07-26 NOTE — PROGRESS NOTES
Notified Dr. Alfredo Duron of pt's critical H&H results. MD ordered to hold transfusion for now d/t disease process and that he would be by to see her.

## 2023-07-26 NOTE — PROGRESS NOTES
Pt A&Ox 1 on RA. AM assessment and vitals completed and put into flowsheets. Pt with no questions or concerns voiced to RN at this time. Fall precautions in place and call light within reach. Assessment difficult to complete d/t pt not responding much verbally. Will say her name occasionally but mostly just closes her eyes. Pt appears to be very lethargic as well. Dr. Canelo Durán at bedside this AM, states this is how the patient appeared in her office yesterday morning.

## 2023-07-26 NOTE — CONSULTS
PALLIATIVE MEDICINE CONSULTATION     Patient name:Sherry Francis   OJN:[id number]    OIJ:[birth year]  Room/Bed:Y4H-6049/5279-01   LOS: 1 day         Date of consult:7/26/2023    Consult Information  Palliative Medicine Consult performed by: MIKI Metz Do, CNP    Inpatient consult to Palliative Care  Consult performed by: MIKI Metz Do, CNP  Consult ordered by: Sarah Harp MD  Reason for consult: Goals of care, code status, family support. ASSESSMENT/RECOMMENDATIONS     65 y.o. female with anemia, altered mental status and debility secondary to prior CVA. Symptom Management:  Anemia -history of chronic hemolytic anemia, oncology following. Received Procrit in the past.  Not much improvement after 1 unit of packed red blood cells. GI consulted for possible acute bleed, however question worsening bone marrow suppression. Altered mental status -patient has not been able to hold a conversation or answer complex questions since prior to her CVA. Education given to patient's  regarding possible baseline. Will await additional consultative services to determine plan. Debility -secondary to CVA. PT OT ordered. Per , patient declined in SNF. Question discharge planning, SNF versus comfort care. DENISE on CKD -nephrology consulted. Would appreciate their input on further medical management of kidney disease. Discussed with the  whether or not he would want dialysis if needed, patient would likely be a poor candidate. Goals of Care -see below. Patient/Family Goals of Care :    Conversation with the patient and  at the bedside, patient minimally participatory. We discussed goals of care in the setting of the patient's decline since CVA. We reviewed CODE STATUS options as well as potential placement versus home in the future. Patient's  unable to verbalize goals.   When addressing CODE STATUS, he states he would like to have

## 2023-07-26 NOTE — CONSULTS
[address], [address]                                  CONSULTATION    PATIENT NAME: Charlie Moncada                      :        1945  MED REC NO:   [id number]                          ROOM:       6692  ACCOUNT NO:   [de-identified]                           ADMIT DATE: 2023  PROVIDER:     Carmelo Yu MD    CONSULT DATE:  2023    REASON FOR CONSULTATION:  Acute kidney injury. HISTORY OF PRESENT ILLNESS:  A 78-year-old female with past medical  history significant for hypertension, hemolytic anemia follows up with  Hematology, chronic kidney disease stage IIIB recently discharged from  [address] [place] [address] [address] after having a stroke and discharged to a rehab  facility, sent back to ER because of low hemoglobin and decline in her  physical status. At the time of presentation, she was found to have  hemoglobin of 6.1 with a potassium of 5.5, and a creatinine of 2.8. The  patient was admitted for further workup and management. Renal  consultation has been called for acute kidney injury. Overnight, the  patient received a unit of blood and feeling some better. At the time of consultation, denies any chest pain, shortness of breath,  but has some dyspnea on exertion. Feeling weak, tired, and decreased of  level of energy, denies any hematuria, dysuria, hematemesis, melena,  fever, chills, or rigors. PAST MEDICAL HISTORY:  1. Recent stroke. 2.  Hypertension. 3.  Chronic kidney disease stage IIIB. 4.  Thyroid disorder. 5.  Hypercholesterolemia. 6.  History of hemolytic anemia. PAST SURGICAL HISTORY:  1.  Status post bone marrow biopsy. 2.  Status post cholecystectomy. 3.  Status post knee surgery. FAMILY HISTORY:  Not significant for any kidney disease. SOCIAL HISTORY:  Does not smoke or drink.     MEDICATIONS:  Include Norvasc, Lasix, TriCor, Ativan, metaxalone,  Folvite, Cozaar, Zocor,

## 2023-07-26 NOTE — ACP (ADVANCE CARE PLANNING)
Advance Care Planning     Advance Care Planning Inpatient Note  Spiritual Care Department    Today's Date: 7/26/2023  Unit: [facility] PROGRESSIVE CARE    Received request from IDT Member. Upon review of chart and communication with care team, request Health Care Provider's clarification of patient's decision making capacity. and Spiritual Care will defer advance care planning with patient at this time. . Patient was/were present in the room during visit. Goals of ACP Conversation:  Unable to have ACP discussion with patient, due to cognitive ability since CVA. Health Care Decision Makers:     No healthcare decision makers have been documented. Click here to complete [address] including selection of the Healthcare Decision Maker Relationship (ie \"Primary\")  Summary:  Documented Next of Kin, per patient report    Advance Care Planning Documents (Patient Wishes):  None     Assessment:  Patient is not responsive since a previous CVA. Palliative Care attempted ACP discussion with patient's husbanc but he was unable to make decisions at this time. Interventions:  Unable to have discussion with patient.  not available.     Care Preferences Communicated:   No    Outcomes/Plan:  ACP Discussion: Refused    Electronically signed by Adrian Melendez [facility] on 7/26/2023 at 3:56 PM

## 2023-07-26 NOTE — PROGRESS NOTES
Occupational Therapy  Facility/Department: [facility] PROGRESSIVE CARE  Occupational Therapy Initial Assessment    Name: Maury Corea  : 1945  MRN: [id number]  Date of Service: 2023    Discharge Recommendations:  3-5 sessions per week, Patient would benefit from continued therapy after discharge      Maury Corea scored a 6/24 on the AM-PAC ADL Inpatient form. Current research shows that an AM-PAC score of 17 or less is typically not associated with a discharge to the patient's home setting. Based on the patient's AM-PAC score and their current ADL deficits, it is recommended that the patient have 3-5 sessions per week of Occupational Therapy at d/c to increase the patient's independence. Please see assessment section for further patient specific details. If patient discharges prior to next session this note will serve as a discharge summary. Please see below for the latest assessment towards goals. Patient Diagnosis(es): The primary encounter diagnosis was Hypotension, unspecified hypotension type. Diagnoses of Anemia, unspecified type, Acute kidney injury superimposed on chronic kidney disease ([address]), Thrombocytopenia ([address]), Elevated troponin, Hyperkalemia, Goals of care, counseling/discussion, and Urinary tract infection associated with indwelling urethral catheter, initial encounter [facility]) were also pertinent to this visit. Past Medical History:  has a past medical history of Anemia, Arthritis, Chronic kidney disease, H/O bladder problems, Hyperlipidemia, Hypertension, and Thyroid disease. Past Surgical History:  has a past surgical history that includes knee surgery; Hysterectomy; Dilation and curettage of uterus; Appendectomy; Cholecystectomy; IR BIOPSY BONE MARROW (2022); and CT BIOPSY BONE MARROW (2022). Assessment   Performance deficits / Impairments: Decreased functional mobility ; Decreased ADL status; Decreased ROM; Decreased strength;Decreased cognition;Decreased balance;Decreased endurance;Decreased fine motor control  Assessment: Pt is a 78-year-old female, recently status post acute ischemic stroke, with history of autoimmune hemolytic anemia, presenting from outpatient with hemoglobin of 6.2. The anemia is redemonstrated in the emergency department, hemoglobin 6.1. The patient was discharged from [facility] 7/6/2023 after admission for acute CVA/metabolic encephalopathy, d/c'd [facility] SNF for rehab. Today-pt presents confused and inconsistently followed commands to partipate. Pt required max A x2 for rolling, total A for toileting hygiene in bed following BM incontinence, and anticipate pt would require total A for ADLs based on PROM, cognition, bed mobility. Pt limited by the above performance deficits and demonstrates need for ongoing skilled OT at d/c to maximize pt's safety and independence. Prognosis: Fair;Guarded  Decision Making: Medium Complexity  History: see above  REQUIRES OT FOLLOW-UP: Yes  Activity Tolerance  Activity Tolerance: Treatment limited secondary to decreased cognition        Plan   Occupational Therapy Plan  Times Per Week: 3-5  Current Treatment Recommendations: Strengthening, ROM, Balance training, Neuromuscular re-education, Cognitive reorientation, Cognitive/Perceptual training, Self-Care / ADL, Safety education & training, Endurance training     Restrictions  Restrictions/Precautions  Restrictions/Precautions: Fall Risk  Position Activity Restriction  Other position/activity restrictions: samaniego cath, h/o cva ~3 wks ago/aphasia. Subjective   General  Chart Reviewed: Yes  Additional Pertinent Hx: Pt is a 78-year-old female, recently status post acute ischemic stroke, with history of autoimmune hemolytic anemia, presenting from outpatient with hemoglobin of 6.2. The anemia is redemonstrated in the emergency department, hemoglobin 6.1.  The patient was discharged from [facility] 7/6/2023 after admission for

## 2023-07-26 NOTE — PROGRESS NOTES
4 Eyes Skin Assessment     NAME:  Viktor Gastelum  YOB: 1945  MEDICAL RECORD NUMBER:  [id number]    The patient is being assessed for  Admission    I agree that at least one RN has performed a thorough Head to Toe Skin Assessment on the patient. ALL assessment sites listed below have been assessed. Areas assessed by both nurses:    Head, Face, Ears, Shoulders, Back, Chest, Arms, Elbows, Hands, Sacrum. Buttock, Coccyx, Ischium, and Legs. Feet and Heels        Does the Patient have a Wound?  No noted wound(s)       Tima Prevention initiated by RN: Yes  Wound Care Orders initiated by RN: No    Pressure Injury (Stage 3,4, Unstageable, DTI, NWPT, and Complex wounds) if present, place Wound referral order by RN under : No    New Ostomies, if present place, Ostomy referral order under : No     Nurse 1 eSignature: Electronically signed by Phoenix Curtis RN on 7/26/23 at 6:40 AM EDT    **SHARE this note so that the co-signing nurse can place an eSignature**    Nurse 2 eSignature: Electronically signed by Melo Cook RN on 7/26/23 at 8:45 AM EDT

## 2023-07-26 NOTE — PROGRESS NOTES
V2.0    [facility] Progress Note      Name:  Wallace Love /Age/Sex: 1945  (65 y.o. female)   MRN & CSN:  [id number] & [id number] Encounter Date/Time: 2023 1:57 PM EDT   Location:  [address] PCP: Gaby Parra MD     Attending:Victoriano Zavala MD       Hospital Day: 2    Assessment and Recommendations   Wallace Love is a 65 y.o. female who presents with DENISE (acute kidney injury) ([address])      Plan:   Hemolytic anemia, Procrit ordered likely this will need to be carried on over rehab per hematology recommendations  UTI started antibiotics pending culture so far urine culture positive from gram-negative martha  CKD nephrology consulted  Acute kidney injury nephrology on board repeat BMP in a.m. Generalized weakness due to above PT OT  History of stroke no acute changes  Thrombocytopenia monitor for now  Hyperkalemia likely hemolyzed will repeat discussed with nursing      Diet ADULT DIET; Regular; 3 carb choices (45 gm/meal); Low Sodium (2 gm)   DVT Prophylaxis [] Lovenox, []  Heparin, [] SCDs, [] Ambulation,  [] Eliquis, [] Xarelto  [] Coumadin   Code Status Full Code             Personally reviewed Lab Studies and Imaging     Discussed management of the case with hematology     Telemetry strip reviewed by myself no ST elevation          Medical Decision Making: The following items were considered in medical decision making:  Discussion of patient care with other providers  Reviewed clinical lab tests  Reviewed radiology tests  Reviewed other diagnostic tests/interventions  Independent review of radiologic images  Microbiology cultures and other micro tests reviewed      Subjective:     Chief Complaint: Weakness    Wallace Love is a 65 y.o. female who presents with weakness and anemia transfused yesterday with 1 unit, transfused again today not feeling much of significant improvement at this time.       Review of Systems:      Pertinent positives and negatives discussed in HPI    Objective: 07/25/2023 04:40 PM         Electronically signed by Milton Larkin MD on 7/26/2023 at 1:57 PM  Comment: Please note this report has been produced using speech recognition software and may contain errors related to that system including errors in grammar, punctuation, and spelling, as well as words and phrases that may be inappropriate. If there are any questions or concerns, please feel free to contact the dictating provider for clarification.

## 2023-07-26 NOTE — PROGRESS NOTES
Indwelling catheter changed. Robbins that came with patient from facility with no insertion date on it.

## 2023-07-27 LAB
ALBUMIN SERPL-MCNC: 3.1 G/DL (ref 3.4–5)
ALBUMIN/GLOB SERPL: 1.1 {RATIO} (ref 1.1–2.2)
ALP SERPL-CCNC: 51 U/L (ref 40–129)
ALT SERPL-CCNC: 16 U/L (ref 10–40)
ANION GAP SERPL CALCULATED.3IONS-SCNC: 14 MMOL/L (ref 3–16)
AST SERPL-CCNC: 20 U/L (ref 15–37)
BASOPHILS # BLD: 0 K/UL (ref 0–0.2)
BASOPHILS NFR BLD: 0.8 %
BILIRUB SERPL-MCNC: 0.5 MG/DL (ref 0–1)
BUN SERPL-MCNC: 78 MG/DL (ref 7–20)
CALCIUM SERPL-MCNC: 9.1 MG/DL (ref 8.3–10.6)
CHLORIDE SERPL-SCNC: 108 MMOL/L (ref 99–110)
CO2 SERPL-SCNC: 19 MMOL/L (ref 21–32)
CREAT SERPL-MCNC: 2.5 MG/DL (ref 0.6–1.2)
DEPRECATED RDW RBC AUTO: 18.2 % (ref 12.4–15.4)
EOSINOPHIL # BLD: 0.1 K/UL (ref 0–0.6)
EOSINOPHIL NFR BLD: 1.7 %
GFR SERPLBLD CREATININE-BSD FMLA CKD-EPI: 19 ML/MIN/{1.73_M2}
GLUCOSE BLD-MCNC: 72 MG/DL (ref 70–99)
GLUCOSE BLD-MCNC: 72 MG/DL (ref 70–99)
GLUCOSE BLD-MCNC: 77 MG/DL (ref 70–99)
GLUCOSE SERPL-MCNC: 67 MG/DL (ref 70–99)
HCT VFR BLD AUTO: 23.5 % (ref 36–48)
HGB BLD-MCNC: 8 G/DL (ref 12–16)
LDH SERPL L TO P-CCNC: 230 U/L (ref 100–190)
LYMPHOCYTES # BLD: 0.5 K/UL (ref 1–5.1)
LYMPHOCYTES NFR BLD: 10.1 %
MCH RBC QN AUTO: 32.7 PG (ref 26–34)
MCHC RBC AUTO-ENTMCNC: 34.1 G/DL (ref 31–36)
MCV RBC AUTO: 95.9 FL (ref 80–100)
MONOCYTES # BLD: 0.2 K/UL (ref 0–1.3)
MONOCYTES NFR BLD: 4.7 %
NEUTROPHILS # BLD: 4.2 K/UL (ref 1.7–7.7)
NEUTROPHILS NFR BLD: 82.7 %
PERFORMED ON: NORMAL
PHOSPHATE SERPL-MCNC: 3.5 MG/DL (ref 2.5–4.9)
PLATELET # BLD AUTO: 73 K/UL (ref 135–450)
PMV BLD AUTO: 7.7 FL (ref 5–10.5)
POTASSIUM SERPL-SCNC: 5.2 MMOL/L (ref 3.5–5.1)
POTASSIUM SERPL-SCNC: 5.2 MMOL/L (ref 3.5–5.1)
PROT SERPL-MCNC: 6 G/DL (ref 6.4–8.2)
RBC # BLD AUTO: 2.45 M/UL (ref 4–5.2)
SODIUM SERPL-SCNC: 141 MMOL/L (ref 136–145)
WBC # BLD AUTO: 5.1 K/UL (ref 4–11)

## 2023-07-27 PROCEDURE — C1751 CATH, INF, PER/CENT/MIDLINE: HCPCS

## 2023-07-27 PROCEDURE — 97530 THERAPEUTIC ACTIVITIES: CPT

## 2023-07-27 PROCEDURE — 85025 COMPLETE CBC W/AUTO DIFF WBC: CPT

## 2023-07-27 PROCEDURE — 6360000002 HC RX W HCPCS: Performed by: INTERNAL MEDICINE

## 2023-07-27 PROCEDURE — 97110 THERAPEUTIC EXERCISES: CPT

## 2023-07-27 PROCEDURE — 80053 COMPREHEN METABOLIC PANEL: CPT

## 2023-07-27 PROCEDURE — 36415 COLL VENOUS BLD VENIPUNCTURE: CPT

## 2023-07-27 PROCEDURE — 83615 LACTATE (LD) (LDH) ENZYME: CPT

## 2023-07-27 PROCEDURE — 02HV33Z INSERTION OF INFUSION DEVICE INTO SUPERIOR VENA CAVA, PERCUTANEOUS APPROACH: ICD-10-PCS | Performed by: INTERNAL MEDICINE

## 2023-07-27 PROCEDURE — 36569 INSJ PICC 5 YR+ W/O IMAGING: CPT

## 2023-07-27 PROCEDURE — 94760 N-INVAS EAR/PLS OXIMETRY 1: CPT

## 2023-07-27 PROCEDURE — 2060000000 HC ICU INTERMEDIATE R&B

## 2023-07-27 PROCEDURE — 2580000003 HC RX 258: Performed by: INTERNAL MEDICINE

## 2023-07-27 RX ORDER — LIDOCAINE HYDROCHLORIDE 10 MG/ML
5 INJECTION, SOLUTION EPIDURAL; INFILTRATION; INTRACAUDAL; PERINEURAL ONCE
Status: DISCONTINUED | OUTPATIENT
Start: 2023-07-27 | End: 2023-07-30

## 2023-07-27 RX ORDER — FUROSEMIDE 10 MG/ML
20 INJECTION INTRAMUSCULAR; INTRAVENOUS ONCE
Status: COMPLETED | OUTPATIENT
Start: 2023-07-27 | End: 2023-07-27

## 2023-07-27 RX ORDER — SODIUM CHLORIDE 0.9 % (FLUSH) 0.9 %
5-40 SYRINGE (ML) INJECTION EVERY 12 HOURS SCHEDULED
Status: DISCONTINUED | OUTPATIENT
Start: 2023-07-27 | End: 2023-07-27 | Stop reason: SDUPTHER

## 2023-07-27 RX ORDER — SODIUM CHLORIDE 0.9 % (FLUSH) 0.9 %
5-40 SYRINGE (ML) INJECTION PRN
Status: DISCONTINUED | OUTPATIENT
Start: 2023-07-27 | End: 2023-07-27 | Stop reason: SDUPTHER

## 2023-07-27 RX ORDER — SODIUM CHLORIDE 9 MG/ML
25 INJECTION, SOLUTION INTRAVENOUS PRN
Status: DISCONTINUED | OUTPATIENT
Start: 2023-07-27 | End: 2023-07-27 | Stop reason: SDUPTHER

## 2023-07-27 RX ORDER — DEXTROSE MONOHYDRATE 100 MG/ML
INJECTION, SOLUTION INTRAVENOUS CONTINUOUS PRN
Status: DISCONTINUED | OUTPATIENT
Start: 2023-07-27 | End: 2023-08-05 | Stop reason: HOSPADM

## 2023-07-27 RX ADMIN — HEPARIN SODIUM 5000 UNITS: 5000 INJECTION INTRAVENOUS; SUBCUTANEOUS at 21:07

## 2023-07-27 RX ADMIN — HEPARIN SODIUM 5000 UNITS: 5000 INJECTION INTRAVENOUS; SUBCUTANEOUS at 05:55

## 2023-07-27 RX ADMIN — FUROSEMIDE 20 MG: 10 INJECTION, SOLUTION INTRAMUSCULAR; INTRAVENOUS at 18:25

## 2023-07-27 RX ADMIN — CEFEPIME 1000 MG: 1 INJECTION, POWDER, FOR SOLUTION INTRAMUSCULAR; INTRAVENOUS at 18:31

## 2023-07-27 RX ADMIN — HEPARIN SODIUM 5000 UNITS: 5000 INJECTION INTRAVENOUS; SUBCUTANEOUS at 18:29

## 2023-07-27 ASSESSMENT — PAIN SCALES - WONG BAKER
WONGBAKER_NUMERICALRESPONSE: 0

## 2023-07-27 NOTE — PROGRESS NOTES
Physical Therapy  Facility/Department: [address] PROGRESSIVE CARE  Physical Therapy Daily Treatment Note    Name: Yasmine Trent  : 1945  MRN: [id number]  Date of Service: 2023    Discharge Recommendations:  Patient would benefit from continued therapy after discharge (3-5x/wk)   PT Equipment Recommendations  Equipment Needed: No      Yasmine Trent scored a 7/24 on the AM-PAC short mobility form. Current research shows that an AM-PAC score of 17 or less is typically not associated with a discharge to the patient's home setting. Based on the patient's AM-PAC score and their current functional mobility deficits, it is recommended that the patient have 3-5 sessions per week of Physical Therapy at d/c to increase the patient's independence. Please see assessment section for further patient specific details. If patient discharges prior to next session this note will serve as a discharge summary. Please see below for the latest assessment towards goals. Patient Diagnosis(es): The primary encounter diagnosis was Hypotension, unspecified hypotension type. Diagnoses of Anemia, unspecified type, Acute kidney injury superimposed on chronic kidney disease ([address]), Thrombocytopenia ([address]), Elevated troponin, Hyperkalemia, Goals of care, counseling/discussion, and Urinary tract infection associated with indwelling urethral catheter, initial encounter [facility]) were also pertinent to this visit. Past Medical History:  has a past medical history of Anemia, Arthritis, Chronic kidney disease, H/O bladder problems, Hyperlipidemia, Hypertension, and Thyroid disease. Past Surgical History:  has a past surgical history that includes knee surgery; Hysterectomy; Dilation and curettage of uterus; Appendectomy; Cholecystectomy; IR BIOPSY BONE MARROW (2022); and CT BIOPSY BONE MARROW (2022).     Assessment   Body Structures, Functions, Activity Limitations Requiring Skilled Therapeutic Intervention: Decreased functional

## 2023-07-27 NOTE — PROGRESS NOTES
Occupational Therapy  Facility/Department: [address] PROGRESSIVE CARE  Occupational Therapy Daily Note  Name: Jaime Gilmore  : 1945  MRN: [id number]  Date of Service: 2023    Discharge Recommendations:  3-5 sessions per week, Patient would benefit from continued therapy after discharge   Jaime Gilmore scored a 6/24 on the AM-PAC ADL Inpatient form. Current research shows that an AM-PAC score of 17 or less is typically not associated with a discharge to the patient's home setting. Based on the patient's AM-PAC score and their current ADL deficits, it is recommended that the patient have 3-5 sessions per week of Occupational Therapy at d/c to increase the patient's independence. Please see assessment section for further patient specific details. If patient discharges prior to next session this note will serve as a discharge summary. Please see below for the latest assessment towards goals. Patient Diagnosis(es): The primary encounter diagnosis was Hypotension, unspecified hypotension type. Diagnoses of Anemia, unspecified type, Acute kidney injury superimposed on chronic kidney disease ([address]), Thrombocytopenia ([address]), Elevated troponin, Hyperkalemia, Goals of care, counseling/discussion, and Urinary tract infection associated with indwelling urethral catheter, initial encounter [facility]) were also pertinent to this visit. Past Medical History:  has a past medical history of Anemia, Arthritis, Chronic kidney disease, H/O bladder problems, Hyperlipidemia, Hypertension, and Thyroid disease. Past Surgical History:  has a past surgical history that includes knee surgery; Hysterectomy; Dilation and curettage of uterus; Appendectomy; Cholecystectomy; IR BIOPSY BONE MARROW (2022); and CT BIOPSY BONE MARROW (2022). Assessment   Performance deficits / Impairments: Decreased functional mobility ; Decreased ADL status; Decreased ROM; Decreased strength;Decreased cognition;Decreased

## 2023-07-27 NOTE — CARE COORDINATION
Case Management Assessment  Initial Evaluation    Date/Time of Evaluation: 7/27/2023 10:38 AM  Assessment Completed by: KLARISSA [place], CÉSARW    If patient is discharged prior to next notation, then this note serves as note for discharge by case management. Patient Name: Clara Jennings                   YOB: 1945  Diagnosis: Hyperkalemia [E87.5]  Thrombocytopenia (HCC) [D69.6]  Elevated troponin [R77.8]  DENISE (acute kidney injury) ([address]) [N17.9]  Goals of care, counseling/discussion [Z71.89]  Hypotension, unspecified hypotension type [I95.9]  Acute kidney injury superimposed on chronic kidney disease ([address]) [N17.9, N18.9]  Anemia, unspecified type [D64.9]                   Date / Time: 7/25/2023 12:25 PM    Patient Admission Status: Inpatient   Readmission Risk (Low < 19, Mod (19-27), High > 27): Readmission Risk Score: 17    Current PCP: John Noriega MD  PCP verified by CM? Yes    Chart Reviewed: Yes      History Provided by: Spouse  Patient Orientation: Unable to Assess (pt asleep)    Patient Cognition:      Hospitalization in the last 30 days (Readmission):  No    If yes, Readmission Assessment in  Navigator will be completed. Advance Directives:      Code Status: Full Code   Patient's Primary Decision Maker is: Legal Next of Kin    Primary Decision MakeJeanne Steiner Spouse - [phone]    Discharge Planning:    Patient lives with: Other (Comment) (SNF) Type of Home: [facility], [address]  Primary Care Giver:  Other (Comment) ([facility])  Patient Support Systems include: Spouse/Significant Other, Other (Comment) (LTC staff.)   Current Financial resources:    Current community resources:    Current services prior to admission: None            Current DME:              Type of Home Care services:  None    ADLS  Prior functional level:    Current functional level:      PT AM-PAC: 7 /24  OT AM-PAC: 6 /24    Family can provide assistance at DC:

## 2023-07-27 NOTE — PROGRESS NOTES
No iv access  perfect served dr Jefferson Real  and dr Sammy Álvarez about need for iv waiting for  orders

## 2023-07-27 NOTE — PROGRESS NOTES
Anne Tamayo stated that pt is left handed = dominate arm,     stated that  ==  he wants  her transferred to the comprehensive stroke center at [facility] after discharge for further stroke work up   perfect served dr Polo Garcia with this information

## 2023-07-27 NOTE — PROGRESS NOTES
V2.0    [facility] [place]      Name:  Stefano Barnes /Age/Sex: 1945  (65 y.o. female)   MRN & CSN:  [id number] & [id number] Encounter Date/Time: 2023 9:50 AM EDT   Location:  [place] [address] PCP: Austyn Orona MD     Attending:Victoriano Redmond MD       Hospital Day: 3    Assessment and Recommendations   Stefano Barnes is a 65 y.o. female who presents with DENISE (acute kidney injury) ([address])      Plan:       Hemolytic anemia, Procrit ordered likely this will need to be carried on over rehab per hematology recommendations discussed with Dr. Laci Enriquez yesterday added 1 unit of packed red blood cell  UTI started antibiotics pending culture so far urine culture positive from gram-negative martha  CKD nephrology consulted  Acute metabolic encephalopathy on top of chronic change in mental status, monitor closely  Acute kidney injury nephrology on board, creatinine decreased to 2.5 this morning  Generalized weakness due to above PT OT  History of stroke no acute changes  Thrombocytopenia monitor for now, platelets down to 73  Hyperkalemia likely hemolyzed, at 5.2 today  Hypoglycemia, hypoglycemia protocol added        Diet ADULT DIET; Regular; 3 carb choices (45 gm/meal); Low Sodium (2 gm)   DVT Prophylaxis [] Lovenox, []  Heparin, [] SCDs, [] Ambulation,  [] Eliquis, [] Xarelto  [] Coumadin   Code Status Full Code             Personally reviewed Lab Studies and Imaging     Discussed management of the case with hematology             Medical Decision Making:   The following items were considered in medical decision making:  Discussion of patient care with other providers  Reviewed clinical lab tests  Reviewed radiology tests  Reviewed other diagnostic tests/interventions  Independent review of radiologic images  Microbiology cultures and other micro tests reviewed      Subjective:     Chief Complaint: Altered mental status weakness    Stefano Barnes is a 65 y.o. female who presents with altered mental Peritoneum/Retroperitoneum: No evidence of lymphadenopathy. Aorta is normal in caliber. No fat stranding, free fluid, free air or focal fluid collection is identified. Bones/Soft Tissues: No fracture or destructive bone lesion is identified. There is a fat containing umbilical hernia. 1. No acute findings in the abdomen or pelvis. 2. Colonic diverticulosis. 3. 2.1 cm left adrenal nodule which contains macroscopic fat consistent with a myelolipoma. No imaging follow-up is recommended. 4. Fat containing umbilical hernia. CBC:   Recent Labs     07/26/23  0711 07/26/23  1219 07/26/23  2041 07/27/23  0451   WBC 5.1 5.5  --  5.1   HGB 6.8* 6.7* 8.3* 8.0*   PLT 76* 93*  --  73*     BMP:    Recent Labs     07/26/23  1219 07/26/23  1351 07/26/23  1530 07/27/23  0451     --  139 141   K  --    < > 5.4* 5.2*  5.2*     --  108 108   CO2 16*  --  18* 19*   BUN 79*  --  88* 78*   CREATININE 2.7*  --  2.7* 2.5*   GLUCOSE 62*  --  66* 67*    < > = values in this interval not displayed.      Hepatic:   Recent Labs     07/26/23  0711 07/26/23  1219 07/27/23  0451   AST 19 29 20   ALT 14 16 16   BILITOT 0.4 0.4 0.5   ALKPHOS 48 45 51     Lipids: No results found for: CHOL, HDL, TRIG  Hemoglobin A1C: No results found for: LABA1C  TSH: No results found for: TSH  Troponin: No results found for: TROPONINT  Lactic Acid:   Recent Labs     07/25/23  1304   LACTA 0.7     BNP:   Recent Labs     07/26/23  1219   PROBNP 1,453*     UA:  Lab Results   Component Value Date/Time    NITRU Negative 07/26/2023 02:00 PM    COLORU Yellow 07/26/2023 02:00 PM    PHUR 5.0 07/26/2023 02:00 PM    WBCUA 30 07/26/2023 02:00 PM    RBCUA 1 07/26/2023 02:00 PM    BACTERIA None Seen 07/26/2023 02:00 PM    CLARITYU Clear 07/26/2023 02:00 PM    SPECGRAV 1.012 07/26/2023 02:00 PM    LEUKOCYTESUR MODERATE 07/26/2023 02:00 PM    UROBILINOGEN 0.2 07/26/2023 02:00 PM    BILIRUBINUR Negative 07/26/2023 02:00 PM    BLOODU Negative 07/26/2023 02:00

## 2023-07-27 NOTE — PROGRESS NOTES
Arrived to place PICC line with bedside RN Swathi Frost. Pre-procedure and timeout done with RN, discussed limitations of placement and allergies. Pt's basilic, brachial, and cephalic are all easily collapsible with no indication for a clot. Vein selected is large enough for catheter. Pt tolerated sterile procedure well, with no difficulty accessing basilic vein, when accessed - blood was free flowing and non-pulsatile. Guidewire, introducer, and catheter went in smoothly. PICC line verified with 3CG technology with peaked P-waves (please see image below). OK to use PICC. Please use new IV tubing when connecting to the newly placed central line. Post procedure - reorganized pt table, placed pt in lowest position, with call light and educated on line care. Reported off to bedside RN. Please call if you have any questions about the PICC or ML. The  will direct you to the PICC RN that is on call.      [phone]

## 2023-07-28 ENCOUNTER — APPOINTMENT (OUTPATIENT)
Dept: CT IMAGING | Age: 78
End: 2023-07-28
Payer: MEDICARE

## 2023-07-28 PROBLEM — R77.8 ELEVATED TROPONIN: Status: ACTIVE | Noted: 2023-07-28

## 2023-07-28 LAB
ALBUMIN SERPL-MCNC: 3 G/DL (ref 3.4–5)
ALBUMIN/GLOB SERPL: 1.1 {RATIO} (ref 1.1–2.2)
ALP SERPL-CCNC: 47 U/L (ref 40–129)
ALT SERPL-CCNC: 13 U/L (ref 10–40)
ANION GAP SERPL CALCULATED.3IONS-SCNC: 15 MMOL/L (ref 3–16)
AST SERPL-CCNC: 17 U/L (ref 15–37)
BACTERIA UR CULT: ABNORMAL
BACTERIA UR CULT: ABNORMAL
BASE EXCESS BLDV CALC-SCNC: -2 MMOL/L
BASOPHILS # BLD: 0 K/UL (ref 0–0.2)
BASOPHILS NFR BLD: 0.7 %
BILIRUB SERPL-MCNC: 0.4 MG/DL (ref 0–1)
BLOOD BANK DISPENSE STATUS: NORMAL
BLOOD BANK DISPENSE STATUS: NORMAL
BLOOD BANK PRODUCT CODE: NORMAL
BLOOD BANK PRODUCT CODE: NORMAL
BPU ID: NORMAL
BPU ID: NORMAL
BUN SERPL-MCNC: 66 MG/DL (ref 7–20)
CALCIUM SERPL-MCNC: 9 MG/DL (ref 8.3–10.6)
CHLORIDE SERPL-SCNC: 110 MMOL/L (ref 99–110)
CO2 BLDV-SCNC: 25 MMOL/L
CO2 SERPL-SCNC: 21 MMOL/L (ref 21–32)
COHGB MFR BLDV: 3.2 %
CREAT SERPL-MCNC: 2.2 MG/DL (ref 0.6–1.2)
DEPRECATED RDW RBC AUTO: 18.2 % (ref 12.4–15.4)
DESCRIPTION BLOOD BANK: NORMAL
DESCRIPTION BLOOD BANK: NORMAL
EOSINOPHIL # BLD: 0.1 K/UL (ref 0–0.6)
EOSINOPHIL NFR BLD: 2.7 %
GFR SERPLBLD CREATININE-BSD FMLA CKD-EPI: 22 ML/MIN/{1.73_M2}
GLUCOSE BLD-MCNC: 96 MG/DL (ref 70–99)
GLUCOSE SERPL-MCNC: 71 MG/DL (ref 70–99)
HCO3 BLDV-SCNC: 24 MMOL/L (ref 23–29)
HCT VFR BLD AUTO: 19.8 % (ref 36–48)
HCT VFR BLD AUTO: 24.9 % (ref 36–48)
HGB BLD-MCNC: 6.6 G/DL (ref 12–16)
HGB BLD-MCNC: 8.5 G/DL (ref 12–16)
LYMPHOCYTES # BLD: 0.6 K/UL (ref 1–5.1)
LYMPHOCYTES NFR BLD: 12 %
MCH RBC QN AUTO: 32.2 PG (ref 26–34)
MCHC RBC AUTO-ENTMCNC: 33.3 G/DL (ref 31–36)
MCV RBC AUTO: 96.8 FL (ref 80–100)
METHGB MFR BLDV: 1 %
MONOCYTES # BLD: 0.3 K/UL (ref 0–1.3)
MONOCYTES NFR BLD: 6.9 %
NEUTROPHILS # BLD: 3.7 K/UL (ref 1.7–7.7)
NEUTROPHILS NFR BLD: 77.7 %
O2 THERAPY: ABNORMAL
ORGANISM: ABNORMAL
ORGANISM: ABNORMAL
PCO2 BLDV: 44.3 MMHG (ref 40–50)
PERFORMED ON: NORMAL
PH BLDV: 7.34 [PH] (ref 7.35–7.45)
PLATELET # BLD AUTO: 75 K/UL (ref 135–450)
PMV BLD AUTO: 7.4 FL (ref 5–10.5)
PO2 BLDV: 35 MMHG
POTASSIUM SERPL-SCNC: 4.8 MMOL/L (ref 3.5–5.1)
PROT SERPL-MCNC: 5.7 G/DL (ref 6.4–8.2)
RBC # BLD AUTO: 2.05 M/UL (ref 4–5.2)
SAO2 % BLDV: 65 %
SODIUM SERPL-SCNC: 146 MMOL/L (ref 136–145)
WBC # BLD AUTO: 4.8 K/UL (ref 4–11)

## 2023-07-28 PROCEDURE — 6360000002 HC RX W HCPCS: Performed by: INTERNAL MEDICINE

## 2023-07-28 PROCEDURE — 2500000003 HC RX 250 WO HCPCS: Performed by: INTERNAL MEDICINE

## 2023-07-28 PROCEDURE — 94760 N-INVAS EAR/PLS OXIMETRY 1: CPT

## 2023-07-28 PROCEDURE — 2580000003 HC RX 258: Performed by: INTERNAL MEDICINE

## 2023-07-28 PROCEDURE — 80053 COMPREHEN METABOLIC PANEL: CPT

## 2023-07-28 PROCEDURE — 85025 COMPLETE CBC W/AUTO DIFF WBC: CPT

## 2023-07-28 PROCEDURE — 36430 TRANSFUSION BLD/BLD COMPNT: CPT

## 2023-07-28 PROCEDURE — 2060000000 HC ICU INTERMEDIATE R&B

## 2023-07-28 PROCEDURE — 70450 CT HEAD/BRAIN W/O DYE: CPT

## 2023-07-28 PROCEDURE — 82803 BLOOD GASES ANY COMBINATION: CPT

## 2023-07-28 PROCEDURE — 85018 HEMOGLOBIN: CPT

## 2023-07-28 PROCEDURE — 99223 1ST HOSP IP/OBS HIGH 75: CPT | Performed by: INTERNAL MEDICINE

## 2023-07-28 PROCEDURE — 85014 HEMATOCRIT: CPT

## 2023-07-28 RX ORDER — SODIUM CHLORIDE 9 MG/ML
5 INJECTION INTRAVENOUS DAILY
Status: DISCONTINUED | OUTPATIENT
Start: 2023-07-28 | End: 2023-07-28

## 2023-07-28 RX ORDER — LEVOTHYROXINE SODIUM 20 UG/ML
75 INJECTION, SOLUTION INTRAVENOUS DAILY
Status: DISCONTINUED | OUTPATIENT
Start: 2023-07-28 | End: 2023-08-01

## 2023-07-28 RX ORDER — SODIUM CHLORIDE 9 MG/ML
INJECTION, SOLUTION INTRAVENOUS PRN
Status: DISCONTINUED | OUTPATIENT
Start: 2023-07-28 | End: 2023-08-05 | Stop reason: HOSPADM

## 2023-07-28 RX ORDER — DEXTROSE MONOHYDRATE 50 MG/ML
INJECTION, SOLUTION INTRAVENOUS CONTINUOUS
Status: DISCONTINUED | OUTPATIENT
Start: 2023-07-28 | End: 2023-07-28

## 2023-07-28 RX ADMIN — HEPARIN SODIUM 5000 UNITS: 5000 INJECTION INTRAVENOUS; SUBCUTANEOUS at 21:08

## 2023-07-28 RX ADMIN — MEROPENEM 1000 MG: 1 INJECTION, POWDER, FOR SOLUTION INTRAVENOUS at 18:18

## 2023-07-28 RX ADMIN — LEVOTHYROXINE SODIUM 76 MCG: 20 INJECTION, SOLUTION INTRAVENOUS at 12:01

## 2023-07-28 RX ADMIN — SODIUM CHLORIDE, PRESERVATIVE FREE 10 ML: 5 INJECTION INTRAVENOUS at 12:04

## 2023-07-28 RX ADMIN — SODIUM BICARBONATE: 84 INJECTION, SOLUTION INTRAVENOUS at 21:10

## 2023-07-28 RX ADMIN — HEPARIN SODIUM 5000 UNITS: 5000 INJECTION INTRAVENOUS; SUBCUTANEOUS at 16:20

## 2023-07-28 RX ADMIN — HEPARIN SODIUM 5000 UNITS: 5000 INJECTION INTRAVENOUS; SUBCUTANEOUS at 06:13

## 2023-07-28 ASSESSMENT — PAIN SCALES - WONG BAKER
WONGBAKER_NUMERICALRESPONSE: 0

## 2023-07-28 NOTE — PROGRESS NOTES
No new heme recs  Transfuse if under 7   Procrit inpt weekly but if she goes to hospice /comfort would not continue  NO indication for a marrow   I have felt her prognosis is poor given her stroke has been  devastating -

## 2023-07-28 NOTE — CARE COORDINATION
SW returned call to [facility], and spoke with Janki, who informed that patient has 3 SNF days left, and does not have LTC beds available. SW informed that she will document that, and that if pt plan is to return, and she has SNF days, then the plan is return to [facility]. SW informed admissions to speak and inform spouse so all questions can be answered and needs met.      Sarah Vincent LMSW, [facility] Management   Phone: [phone]  Fax: [phone]

## 2023-07-28 NOTE — PROGRESS NOTES
Pt responding    while sitting pt up in bed we ask if the bed was to high sh clearly  said   YES  ,,    opened eyes wide a few times with response to questions  but did close them ,   when asked if wanted a drink of water she shook her head no ,  ,, while coughing  reaches for tissues ,  scatch the end of nose and below both eyes ,,, when asked to raise right arm up she did raise the correct  one up with no one touching it  and the same on the left side . Brigida Barragan

## 2023-07-28 NOTE — PROGRESS NOTES
Received call from lab regarding critical results. Hgb is 6.6, Hct is 19.8. Patient's Na is at 146. I perfect serve the hospitalist on call, ask if they wanted a unit of PRBCs and if they would like the sodium bicarb drip stopped. They responded with hold off prbc until primary attending decides due to hemolytic anemia. No new orders were given.

## 2023-07-28 NOTE — PROGRESS NOTES
V2.0    [facility] Progress Note      Name:  Wilma Pierre /Age/Sex: 1945  (65 y.o. female)   MRN & CSN:  [id number] & [id number] Encounter Date/Time: 2023 12:34 PM EDT   Location:  [id number] PCP: Reid Grossman MD     Attending:Victoriano Paris MD       Hospital Day: 4    Assessment and Recommendations   Wilma Pierre is a 65 y.o. female who presents with DENISE (acute kidney injury) ([address])      Plan:       Hemolytic anemia, Procrit ordered likely this will need to be carried on over rehab per hematology recommendations discussed with Dr. Gabriela Fried during this admission, today hemoglobin 6.6, will transfuse again with 101 unit  UTI started antibiotics pending culture so far urine culture positive Pseudomonas and E. coli, patient on cefepime even though patient presented with her encephalopathy, I will change cephalosporins especially cefepime to meropenem at this time not to deepen her encephalopathy. CKD nephrology consulted creatinine 2.2 this morning  Acute metabolic encephalopathy on top of chronic change in mental status, monitor closely, ordered CT head, ordered VBG, TSH elevated on admission but FT4 within normal limits patient on levothyroxine will change to IV, will do 50% of her home dose discussed with nursing staff. Neurology consulted. Acute kidney injury nephrology on board, creatinine decreased to 2.2 this morning  Generalized weakness due to above PT OT patient not participating  History of stroke as above, family requesting follow-up after discharge with stroke center neurology consulted will follow on further recommendations  Thrombocytopenia monitor for now  Hypoglycemia, hypoglycemia protocol added  Increased troponin, due to acute kidney injury and CKD we will repeat and will consult cardiology for confirmation        Diet ADULT DIET; Regular; 3 carb choices (45 gm/meal);  Low Sodium (2 gm)   DVT Prophylaxis [] Lovenox, []  Heparin, [] SCDs, [] Ambulation,  [] Eliquis, LABA1C  TSH: No results found for: TSH  Troponin: No results found for: TROPONINT  Lactic Acid:   Recent Labs     07/25/23  1304   LACTA 0.7     BNP:   Recent Labs     07/26/23  1219   PROBNP 1,453*     UA:  Lab Results   Component Value Date/Time    NITRU Negative 07/26/2023 02:00 PM    COLORU Yellow 07/26/2023 02:00 PM    PHUR 5.0 07/26/2023 02:00 PM    WBCUA 30 07/26/2023 02:00 PM    RBCUA 1 07/26/2023 02:00 PM    BACTERIA None Seen 07/26/2023 02:00 PM    CLARITYU Clear 07/26/2023 02:00 PM    SPECGRAV 1.012 07/26/2023 02:00 PM    LEUKOCYTESUR MODERATE 07/26/2023 02:00 PM    UROBILINOGEN 0.2 07/26/2023 02:00 PM    BILIRUBINUR Negative 07/26/2023 02:00 PM    BLOODU Negative 07/26/2023 02:00 PM    GLUCOSEU Negative 07/26/2023 02:00 PM    KETUA TRACE 07/26/2023 02:00 PM     Urine Cultures:   Lab Results   Component Value Date/Time    LABURIN >100,000 CFU/ml 07/25/2023 04:40 PM    LABURIN >100,000 CFU/ml 07/25/2023 04:40 PM     Blood Cultures: No results found for: BC  No results found for: BLOODCULT2  Organism:   Lab Results   Component Value Date/Time    ORG Escherichia coli 07/25/2023 04:40 PM    ORG Pseudomonas aeruginosa 07/25/2023 04:40 PM         Electronically signed by Katya Cardoso MD on 7/28/2023 at 12:34 PM  Comment: Please note this report has been produced using speech recognition software and may contain errors related to that system including errors in grammar, punctuation, and spelling, as well as words and phrases that may be inappropriate. If there are any questions or concerns, please feel free to contact the dictating provider for clarification.

## 2023-07-28 NOTE — PROGRESS NOTES
provided emotional support and spiritual care for patient's  \"Alfonzo\". He is [address] but wanted to see the [address] He is still hopeful that patient will regain speech and responsiveness.

## 2023-07-28 NOTE — PROGRESS NOTES
Physical Therapy  Attempt    Medical hold - pt's Hgb is 6.6.     Electronically signed by Ana Flor, PT [id number] on 7/28/2023 at 10:25 AM

## 2023-07-28 NOTE — PROGRESS NOTES
Clinical Pharmacy Note  Renal Dose Adjustment    Viktor Gastelum is receiving meropenem. This medication is renally eliminated. Based on the patient's Estimated Creatinine Clearance: 27 mL/min (A) (based on SCr of 2.2 mg/dL (H)). and urine output, the dose has been adjusted to meropenem 1 g every 12 hours per protocol. Pharmacy will continue to monitor and adjust dose as needed for changes in renal function.     Alpha Payer, [facility], PharmD, 7/28/2023 12:43 PM

## 2023-07-28 NOTE — CONSULTS
NEUROLOGY CONSULT NOTE  Reason for Consult: Dr Katya Cardoso MD asked me to see Ashli Deleon in consultation for evaluation of stroke about 4 weeks ago,  wants further work up. Chief complaint: AMS    HISTORY OF PRESENT ILLNESS:  HPI was gathered from  EMR review as patient is encephalopathic and there was no family at the bedside at the time of encounter. Ashli Deleon is a 65 y.o. female with a PMH that includes hemolytic anemia, arthritis, CKD, HLD, HTN, thyroid disease and recent ischemic stroke. Patient presented to the ED from her PCP office 3 days ago for low hemoglobin (6.2). She recently had a stroke and was seen at [address] [place] [address]. She was discharged to rehab. She saw her hematologist, Dr. Alyson Rodas on 7/26/23. Patient was minimally responsive in the ED. She remains so on my exam.  Dr. Alyson Rodas has recommended palliative care.     MEDICAL HISTORY:  Past Medical History:   Diagnosis Date    Anemia     Arthritis     Chronic kidney disease     H/O bladder problems     Hyperlipidemia     Hypertension     Thyroid disease      Past Surgical History:   Procedure Laterality Date    APPENDECTOMY      CHOLECYSTECTOMY      CT BONE MARROW BIOPSY  12/1/2022    CT BONE MARROW BIOPSY [facility] CT    DILATION AND CURETTAGE OF UTERUS      HYSTERECTOMY (CERVIX STATUS UNKNOWN)      IR BIOPSY PERC SUPERF BONE  12/1/2022    IR BIOPSY PERC SUPERF BONE [facility] SPECIAL PROCEDURES    KNEE SURGERY       Scheduled Meds:   lidocaine 1 % injection  5 mL IntraDERmal Once    epoetin jay-epbx  40,000 Units SubCUTAneous Q7 Days    cefepime  1,000 mg IntraVENous Q24H    sodium chloride flush  5-40 mL IntraVENous 2 times per day    heparin (porcine)  5,000 Units SubCUTAneous 3 times per day     Continuous Infusions:   dextrose      sodium bicarbonate infusion 50 mL/hr at 07/28/23 0616    sodium chloride       PRN Meds:.glucose, dextrose bolus **OR** dextrose bolus, glucagon (rDNA), dextrose, sodium chloride flush, sodium ventricular segmental wall motion is normal.     * The left ventricular diastolic function is indeterminate. * Right ventricular systolic function is normal.     * Estimated pulmonary artery systolic pressure is 36 mmHg. * There is mild mitral valve stenosis. MRI Brain w/o contrast 6/26/23. Reviewed the read from [facility].  Impression   No acute cortical infarct. Remote right temporal lobe infarct and  small areas of remote cortical infarct left occipital parietal region. Tiny foci  of abnormal diffusion involving the right parietal lobe and right thalamus  consistent with tiny lacunar infarcts. Remote lacunar infarct inferior right  basal ganglia. CTA Head & Neck w/contrast.  6/26/23. Reviewed the read from [facility].  Impression   No large vessel occlusion, dissection, or aneurysm identified. IMPRESSION:  1. Altered mental status  2. Recent thalamic stroke  3. Hemolytic anemia  4. Thrombocytopenia  5. Morbid obesity    Penny Mckinney is a 65 y.o. female who presented with AMS and low hemoglobin. Neurology was consulted for further stroke work up. Review of record show patent had appropriate work up at outside hospital with MRI, CTAs and echocardiogram.    RECOMMENDATIONS:  -Ideally patient would be on ASA 81 mg daily, but currently contraindicated. -Agree with palliative care involvement.  -No other neurological work up needed for stroke. -PT/OT/SLP as able. -Will discuss with neurology attending, Dr. Chidi Wilcox, this afternoon. See attestation for additional recommendations.     AYLA Davis-BC  [address] [facility] Neurology    A copy of this note was provided for Dr Gabriella Ram MD

## 2023-07-28 NOTE — PROGRESS NOTES
PALLIATIVE MEDICINE PROGRESS NOTE     Patient name:Sherry Richter Speaker    WNA:[id number] :1945  Room/Bed:J0C-6593/5279-01    LOS: 3 days        ASSESSMENT/RECOMMENDATIONS     65 y.o. female with anemia, altered mental status and debility secondary to prior CVA. Anemia -history of chronic hemolytic anemia, oncology following. GI signed off. Hematology stating can only do supportive care. Reinforced inability to maintain transfusion support outpatient due to debility and increased needs. Altered mental status -Improved. Able to answer questions appropriately. Debility -secondary to CVA. PT OT ordered, not able to participate in care. Per , patient declined in SNF. Question discharge planning, SNF versus comfort care. DENISE on CKD -nephrology consulted. No current plan for RRT. Elevated cardiac enzymes - ? Secondary to volume loss. Reviewed echo with family. Cardiology consulted. Goals of Care -see below. Patient/Family Goals of Care :    23 - Conversation with the patient and  at the bedside, patient minimally participatory. We discussed goals of care in the setting of the patient's decline since CVA. We reviewed CODE STATUS options as well as potential placement versus home in the future. Patient's  unable to verbalize goals. When addressing CODE STATUS, he states he would like to have that discussion with the patient when she is \"lucid\". Reminded that the patient has not been able to have a quality conversation prior to her CVA, may be her baseline. We reviewed that unfortunately in some circumstances, the patient's next of kin does have to make these decisions and the patient is unable. The patient's  became tearful, he understands that this is a grim situation, however is not able to make these decisions at this time. We discussed quality of life over quantity. Unfortunately, the patient's  has a minimal support system.   He does have 2 sons from a prior marriage, encouraged him to reach out to them for support. We will continue to have gentle conversations as the plan unfolds. 7/28/23 - Reviewed Goals of care with the patient, niece, and RN at bedside. We reviewed overall prognosis, neurology and oncology thought process. Although mental status is improving, patients overall health is not. Patient was alert and able to answer questions appropriately. Niece asked the patient if she wants to continue this care, the patient stated \"no\". Spoke with  over the phone, again reviewed documentation and lab work. Overall prognosis is poor given several co-morbilities. We reviewed code status options as well as hospice vs aggressive management.  states, \"I need to think about all this\". He is requesting a call on 7/29/23 to review wishes, attending and Dr. Angela Martinez made aware. Disposition/Discharge Plan :    Pending. Advance Directives:  Code status:  Full Code  Decision Maker: Patient/. Case Discussed with:  patient, floor RN, , Dr. Gabriela Fried, Dr. Airam Hernandez. Thank you for allowing us to participate in the care of this patient. SUBJECTIVE     Chief Complaint: Low hemoglobin     Last 24 hours:   No acute events overnight. Patient more responsive, able to answer questions appropriately. Was able to lift her arm on demand. Receiving blood transfusion. ROS:    Review of Systems     Patient unable to complete full ROS due to current cognitive status. Information that is obtained from nursing and chart            Physical Exam  Cardiovascular:      Rate and Rhythm: Normal rate. Pulmonary:      Effort: Pulmonary effort is normal.   Abdominal:      Palpations: Abdomen is soft. Neurological:      Mental Status: She is alert.          Palliative Medicine Interventions:    patient/family support  Goals of Care discussions with patient/surrogate      DATA:  Current labs in the epic chart reviewed as of 7/28/2023

## 2023-07-28 NOTE — CONSULTS
[address]  1945 July 28, 2023    Reason for Consult: Elevated Troponin    CC: Anemia    HPI:  The patient is 65 y.o. female with a past medical history significant for a recent CVA, chronic hemolytic anemia, essential hypertension and CKD who presented to [facility] ED with severe anemia and a hemoglobin of 6.2. I was asked to see her for elevated troponin assays. Marlen Rivera is somewhat lethargic though does not appear overtly obtunded. Her family is present in the room. She denies any chest pain or tightness. She does not appear short of breath. Her family states that she has been very \"sleepy\" ever since her stroke several weeks ago. She had been hospitalized at [facility] and was in a rehab facility. The family is somewhat disgruntled that they had not checked her blood counts at the rehab facility. Review of Systems:  Review of systems is as above and otherwise unobtainable due to patient's lethargic status. Some history was obtained from the family who is present in the room.     Past Medical History:   Diagnosis Date    Anemia     Arthritis     Chronic kidney disease     H/O bladder problems     Hyperlipidemia     Hypertension     Thyroid disease      Past Surgical History:   Procedure Laterality Date    APPENDECTOMY      CHOLECYSTECTOMY      CT BONE MARROW BIOPSY  12/1/2022    CT BONE MARROW BIOPSY [facility] CT    DILATION AND CURETTAGE OF UTERUS      HYSTERECTOMY (CERVIX STATUS UNKNOWN)      IR BIOPSY PERC SUPERF BONE  12/1/2022    IR BIOPSY PERC SUPERF BONE [facility] SPECIAL PROCEDURES    KNEE SURGERY       Family History   Problem Relation Age of Onset    Cancer Mother     Stroke Mother     Hypertension Mother     Arthritis Mother     Obesity Mother     Coronary Art Dis Father     High Cholesterol Father      Social History     Tobacco Use    Smoking status: Never       Allergies   Allergen Reactions    Nsaids      Current Facility-Administered

## 2023-07-29 LAB
ALBUMIN SERPL-MCNC: 2.5 G/DL (ref 3.4–5)
ALBUMIN SERPL-MCNC: 2.9 G/DL (ref 3.4–5)
ANION GAP SERPL CALCULATED.3IONS-SCNC: 14 MMOL/L (ref 3–16)
ANION GAP SERPL CALCULATED.3IONS-SCNC: 14 MMOL/L (ref 3–16)
BUN SERPL-MCNC: 47 MG/DL (ref 7–20)
BUN SERPL-MCNC: 54 MG/DL (ref 7–20)
CALCIUM SERPL-MCNC: 8.9 MG/DL (ref 8.3–10.6)
CALCIUM SERPL-MCNC: 9.3 MG/DL (ref 8.3–10.6)
CHLORIDE SERPL-SCNC: 113 MMOL/L (ref 99–110)
CHLORIDE SERPL-SCNC: 115 MMOL/L (ref 99–110)
CO2 SERPL-SCNC: 21 MMOL/L (ref 21–32)
CO2 SERPL-SCNC: 22 MMOL/L (ref 21–32)
CREAT SERPL-MCNC: 1.8 MG/DL (ref 0.6–1.2)
CREAT SERPL-MCNC: 2 MG/DL (ref 0.6–1.2)
DEPRECATED RDW RBC AUTO: 18 % (ref 12.4–15.4)
GFR SERPLBLD CREATININE-BSD FMLA CKD-EPI: 25 ML/MIN/{1.73_M2}
GFR SERPLBLD CREATININE-BSD FMLA CKD-EPI: 28 ML/MIN/{1.73_M2}
GLUCOSE BLD-MCNC: 101 MG/DL (ref 70–99)
GLUCOSE BLD-MCNC: 107 MG/DL (ref 70–99)
GLUCOSE BLD-MCNC: 85 MG/DL (ref 70–99)
GLUCOSE BLD-MCNC: 88 MG/DL (ref 70–99)
GLUCOSE BLD-MCNC: 91 MG/DL (ref 70–99)
GLUCOSE BLD-MCNC: 97 MG/DL (ref 70–99)
GLUCOSE SERPL-MCNC: 103 MG/DL (ref 70–99)
GLUCOSE SERPL-MCNC: 78 MG/DL (ref 70–99)
HCT VFR BLD AUTO: 24.1 % (ref 36–48)
HGB BLD-MCNC: 8.1 G/DL (ref 12–16)
MAGNESIUM SERPL-MCNC: 2.2 MG/DL (ref 1.8–2.4)
MCH RBC QN AUTO: 32.2 PG (ref 26–34)
MCHC RBC AUTO-ENTMCNC: 33.8 G/DL (ref 31–36)
MCV RBC AUTO: 95.3 FL (ref 80–100)
PERFORMED ON: ABNORMAL
PERFORMED ON: ABNORMAL
PERFORMED ON: NORMAL
PHOSPHATE SERPL-MCNC: 2.3 MG/DL (ref 2.5–4.9)
PHOSPHATE SERPL-MCNC: 2.5 MG/DL (ref 2.5–4.9)
PLATELET # BLD AUTO: 79 K/UL (ref 135–450)
PMV BLD AUTO: 7.1 FL (ref 5–10.5)
POTASSIUM SERPL-SCNC: 4.4 MMOL/L (ref 3.5–5.1)
POTASSIUM SERPL-SCNC: 4.8 MMOL/L (ref 3.5–5.1)
RBC # BLD AUTO: 2.53 M/UL (ref 4–5.2)
SODIUM SERPL-SCNC: 149 MMOL/L (ref 136–145)
SODIUM SERPL-SCNC: 150 MMOL/L (ref 136–145)
WBC # BLD AUTO: 3.9 K/UL (ref 4–11)

## 2023-07-29 PROCEDURE — 36415 COLL VENOUS BLD VENIPUNCTURE: CPT

## 2023-07-29 PROCEDURE — 83735 ASSAY OF MAGNESIUM: CPT

## 2023-07-29 PROCEDURE — 85027 COMPLETE CBC AUTOMATED: CPT

## 2023-07-29 PROCEDURE — 2500000003 HC RX 250 WO HCPCS: Performed by: INTERNAL MEDICINE

## 2023-07-29 PROCEDURE — 6360000002 HC RX W HCPCS: Performed by: INTERNAL MEDICINE

## 2023-07-29 PROCEDURE — 94760 N-INVAS EAR/PLS OXIMETRY 1: CPT

## 2023-07-29 PROCEDURE — 80069 RENAL FUNCTION PANEL: CPT

## 2023-07-29 PROCEDURE — 2060000000 HC ICU INTERMEDIATE R&B

## 2023-07-29 PROCEDURE — 2580000003 HC RX 258: Performed by: INTERNAL MEDICINE

## 2023-07-29 RX ORDER — DEXTROSE MONOHYDRATE 50 MG/ML
INJECTION, SOLUTION INTRAVENOUS CONTINUOUS
Status: DISCONTINUED | OUTPATIENT
Start: 2023-07-29 | End: 2023-07-30

## 2023-07-29 RX ADMIN — HEPARIN SODIUM 5000 UNITS: 5000 INJECTION INTRAVENOUS; SUBCUTANEOUS at 21:31

## 2023-07-29 RX ADMIN — HEPARIN SODIUM 5000 UNITS: 5000 INJECTION INTRAVENOUS; SUBCUTANEOUS at 05:20

## 2023-07-29 RX ADMIN — DEXTROSE MONOHYDRATE: 50 INJECTION, SOLUTION INTRAVENOUS at 12:21

## 2023-07-29 RX ADMIN — LEVOTHYROXINE SODIUM 76 MCG: 20 INJECTION, SOLUTION INTRAVENOUS at 09:28

## 2023-07-29 RX ADMIN — MEROPENEM 1000 MG: 1 INJECTION, POWDER, FOR SOLUTION INTRAVENOUS at 13:29

## 2023-07-29 RX ADMIN — MEROPENEM 1000 MG: 1 INJECTION, POWDER, FOR SOLUTION INTRAVENOUS at 01:16

## 2023-07-29 RX ADMIN — HEPARIN SODIUM 5000 UNITS: 5000 INJECTION INTRAVENOUS; SUBCUTANEOUS at 13:29

## 2023-07-29 ASSESSMENT — PAIN SCALES - WONG BAKER
WONGBAKER_NUMERICALRESPONSE: 0

## 2023-07-29 NOTE — PROGRESS NOTES
Pt had 33 beats of PAT and heart rate 170s. RN accessed pt. PT current heart rate 80-90s and bp 164/75. RN notified provider.  RN will continue to monitor

## 2023-07-29 NOTE — PROGRESS NOTES
V2.0    [facility] Progress Note      Name:  Lauren Farrell /Age/Sex: 1945  (65 y.o. female)   MRN & CSN:  [id number] & [id number] Encounter Date/Time: 2023 11:45 AM EDT   Location:  [address]6797-38 PCP: Usama Crews MD     Attending:Victoriano Wilder MD       Hospital Day: 5    Assessment and Recommendations   Lauren Farrell is a 65 y.o. female who presents with DENISE (acute kidney injury) ([address])      Plan:     Hemolytic anemia, Procrit ordered, discussed multiple times with hematology, overall prognosis not bright, transfuse if hemoglobin below 7  UTI started antibiotics pending culture so far urine culture positive Pseudomonas and E. coli, patient was on cefepime even though patient presented with her encephalopathy,  changed cephalosporins especially cefepime to meropenem. CKD nephrology consulted creatinine 2.2 this morning  Acute metabolic encephalopathy on top of chronic change in mental status, monitor closely, ordered CT head, ordered VBG, neurology consulted, mental status fluctuating. Goals of care being discussed   Acute kidney injury nephrology on board, creatinine decreased to 2.2 this morning  Generalized weakness due to above PT OT patient not participating  History of stroke as above, family requesting follow-up after discharge with stroke center neurology consulted will follow on further recommendations  Thrombocytopenia monitor for now, as this morning at 79  Hypernatremia D5  Hypoglycemia, hypoglycemia protocol added  Increased troponin, due to acute kidney injury and CKD  Hypothyroidism on supplement  Diet ADULT DIET; Regular; 3 carb choices (45 gm/meal); Low Sodium (2 gm)   DVT Prophylaxis [] Lovenox, []  Heparin, [] SCDs, [] Ambulation,  [] Eliquis, [] Xarelto  [] Coumadin   Code Status Full Code             Personally reviewed Lab Studies and Imaging         Medical Decision Making:   The following items were considered in medical decision making:  Discussion of patient

## 2023-07-29 NOTE — PROGRESS NOTES
Pt family requested to speak to palliative care.  RN called palliative care and left a message with families request.

## 2023-07-29 NOTE — PROGRESS NOTES
Patient very sleepy throughout shift. Would slightly arouse with care, but would only briefly open her eyes, then dozed back off. Patient was able to squeeze this RN's hands and wiggle her toes, but did not follow any other commands. Did not answer questions. Mouth care provided, turned q2hrs. PICC and samaniego intact and WDL.

## 2023-07-30 LAB
ALBUMIN SERPL-MCNC: 2.5 G/DL (ref 3.4–5)
ALBUMIN SERPL-MCNC: 2.7 G/DL (ref 3.4–5)
ALBUMIN SERPL-MCNC: 2.8 G/DL (ref 3.4–5)
ANION GAP SERPL CALCULATED.3IONS-SCNC: 10 MMOL/L (ref 3–16)
ANION GAP SERPL CALCULATED.3IONS-SCNC: 6 MMOL/L (ref 3–16)
ANION GAP SERPL CALCULATED.3IONS-SCNC: 7 MMOL/L (ref 3–16)
BUN SERPL-MCNC: 36 MG/DL (ref 7–20)
BUN SERPL-MCNC: 36 MG/DL (ref 7–20)
BUN SERPL-MCNC: 37 MG/DL (ref 7–20)
BUN SERPL-MCNC: 38 MG/DL (ref 7–20)
BUN SERPL-MCNC: 43 MG/DL (ref 7–20)
CALCIUM SERPL-MCNC: 8.3 MG/DL (ref 8.3–10.6)
CALCIUM SERPL-MCNC: 8.8 MG/DL (ref 8.3–10.6)
CALCIUM SERPL-MCNC: 9 MG/DL (ref 8.3–10.6)
CALCIUM SERPL-MCNC: 9.2 MG/DL (ref 8.3–10.6)
CALCIUM SERPL-MCNC: 9.4 MG/DL (ref 8.3–10.6)
CHLORIDE SERPL-SCNC: 105 MMOL/L (ref 99–110)
CHLORIDE SERPL-SCNC: 114 MMOL/L (ref 99–110)
CHLORIDE SERPL-SCNC: 115 MMOL/L (ref 99–110)
CHLORIDE SERPL-SCNC: 115 MMOL/L (ref 99–110)
CHLORIDE SERPL-SCNC: 116 MMOL/L (ref 99–110)
CO2 SERPL-SCNC: 26 MMOL/L (ref 21–32)
CO2 SERPL-SCNC: 27 MMOL/L (ref 21–32)
CO2 SERPL-SCNC: 28 MMOL/L (ref 21–32)
CO2 SERPL-SCNC: 29 MMOL/L (ref 21–32)
CO2 SERPL-SCNC: 29 MMOL/L (ref 21–32)
CREAT SERPL-MCNC: 1.5 MG/DL (ref 0.6–1.2)
CREAT SERPL-MCNC: 1.5 MG/DL (ref 0.6–1.2)
CREAT SERPL-MCNC: 1.6 MG/DL (ref 0.6–1.2)
CREAT SERPL-MCNC: 1.8 MG/DL (ref 0.6–1.2)
CREAT SERPL-MCNC: 1.8 MG/DL (ref 0.6–1.2)
DEPRECATED RDW RBC AUTO: 18.5 % (ref 12.4–15.4)
GFR SERPLBLD CREATININE-BSD FMLA CKD-EPI: 28 ML/MIN/{1.73_M2}
GFR SERPLBLD CREATININE-BSD FMLA CKD-EPI: 28 ML/MIN/{1.73_M2}
GFR SERPLBLD CREATININE-BSD FMLA CKD-EPI: 33 ML/MIN/{1.73_M2}
GFR SERPLBLD CREATININE-BSD FMLA CKD-EPI: 35 ML/MIN/{1.73_M2}
GFR SERPLBLD CREATININE-BSD FMLA CKD-EPI: 35 ML/MIN/{1.73_M2}
GLUCOSE BLD-MCNC: 110 MG/DL (ref 70–99)
GLUCOSE BLD-MCNC: 111 MG/DL (ref 70–99)
GLUCOSE BLD-MCNC: 113 MG/DL (ref 70–99)
GLUCOSE BLD-MCNC: 115 MG/DL (ref 70–99)
GLUCOSE SERPL-MCNC: 104 MG/DL (ref 70–99)
GLUCOSE SERPL-MCNC: 106 MG/DL (ref 70–99)
GLUCOSE SERPL-MCNC: 110 MG/DL (ref 70–99)
GLUCOSE SERPL-MCNC: 491 MG/DL (ref 70–99)
GLUCOSE SERPL-MCNC: 95 MG/DL (ref 70–99)
HCT VFR BLD AUTO: 23.5 % (ref 36–48)
HGB BLD-MCNC: 7.9 G/DL (ref 12–16)
MAGNESIUM SERPL-MCNC: 2 MG/DL (ref 1.8–2.4)
MCH RBC QN AUTO: 32.1 PG (ref 26–34)
MCHC RBC AUTO-ENTMCNC: 33.6 G/DL (ref 31–36)
MCV RBC AUTO: 95.6 FL (ref 80–100)
PERFORMED ON: ABNORMAL
PHOSPHATE SERPL-MCNC: 1.7 MG/DL (ref 2.5–4.9)
PHOSPHATE SERPL-MCNC: 2 MG/DL (ref 2.5–4.9)
PHOSPHATE SERPL-MCNC: 2.2 MG/DL (ref 2.5–4.9)
PLATELET # BLD AUTO: 92 K/UL (ref 135–450)
PMV BLD AUTO: 7.1 FL (ref 5–10.5)
POTASSIUM SERPL-SCNC: 3.8 MMOL/L (ref 3.5–5.1)
POTASSIUM SERPL-SCNC: 3.9 MMOL/L (ref 3.5–5.1)
POTASSIUM SERPL-SCNC: 4.1 MMOL/L (ref 3.5–5.1)
POTASSIUM SERPL-SCNC: 4.2 MMOL/L (ref 3.5–5.1)
POTASSIUM SERPL-SCNC: 4.7 MMOL/L (ref 3.5–5.1)
RBC # BLD AUTO: 2.46 M/UL (ref 4–5.2)
SODIUM SERPL-SCNC: 138 MMOL/L (ref 136–145)
SODIUM SERPL-SCNC: 149 MMOL/L (ref 136–145)
SODIUM SERPL-SCNC: 150 MMOL/L (ref 136–145)
SODIUM SERPL-SCNC: 152 MMOL/L (ref 136–145)
SODIUM SERPL-SCNC: 152 MMOL/L (ref 136–145)
TROPONIN, HIGH SENSITIVITY: 99 NG/L (ref 0–14)
WBC # BLD AUTO: 3.4 K/UL (ref 4–11)

## 2023-07-30 PROCEDURE — 85027 COMPLETE CBC AUTOMATED: CPT

## 2023-07-30 PROCEDURE — 2060000000 HC ICU INTERMEDIATE R&B

## 2023-07-30 PROCEDURE — 2580000003 HC RX 258: Performed by: INTERNAL MEDICINE

## 2023-07-30 PROCEDURE — 83735 ASSAY OF MAGNESIUM: CPT

## 2023-07-30 PROCEDURE — 36415 COLL VENOUS BLD VENIPUNCTURE: CPT

## 2023-07-30 PROCEDURE — 6360000002 HC RX W HCPCS: Performed by: INTERNAL MEDICINE

## 2023-07-30 PROCEDURE — 36592 COLLECT BLOOD FROM PICC: CPT

## 2023-07-30 PROCEDURE — 2500000003 HC RX 250 WO HCPCS: Performed by: INTERNAL MEDICINE

## 2023-07-30 PROCEDURE — 94760 N-INVAS EAR/PLS OXIMETRY 1: CPT

## 2023-07-30 PROCEDURE — 84484 ASSAY OF TROPONIN QUANT: CPT

## 2023-07-30 PROCEDURE — 80069 RENAL FUNCTION PANEL: CPT

## 2023-07-30 RX ORDER — SODIUM CHLORIDE 9 MG/ML
INJECTION, SOLUTION INTRAVENOUS CONTINUOUS
Status: DISCONTINUED | OUTPATIENT
Start: 2023-07-30 | End: 2023-07-30

## 2023-07-30 RX ORDER — DEXTROSE MONOHYDRATE 50 MG/ML
INJECTION, SOLUTION INTRAVENOUS CONTINUOUS
Status: DISCONTINUED | OUTPATIENT
Start: 2023-07-30 | End: 2023-08-02

## 2023-07-30 RX ADMIN — SODIUM CHLORIDE, PRESERVATIVE FREE 10 ML: 5 INJECTION INTRAVENOUS at 22:02

## 2023-07-30 RX ADMIN — DEXTROSE MONOHYDRATE: 50 INJECTION, SOLUTION INTRAVENOUS at 02:21

## 2023-07-30 RX ADMIN — SODIUM CHLORIDE: 9 INJECTION, SOLUTION INTRAVENOUS at 14:19

## 2023-07-30 RX ADMIN — MEROPENEM 1000 MG: 1 INJECTION, POWDER, FOR SOLUTION INTRAVENOUS at 02:21

## 2023-07-30 RX ADMIN — SODIUM CHLORIDE, PRESERVATIVE FREE 10 ML: 5 INJECTION INTRAVENOUS at 08:28

## 2023-07-30 RX ADMIN — MEROPENEM 1000 MG: 1 INJECTION, POWDER, FOR SOLUTION INTRAVENOUS at 14:05

## 2023-07-30 RX ADMIN — HEPARIN SODIUM 5000 UNITS: 5000 INJECTION INTRAVENOUS; SUBCUTANEOUS at 14:05

## 2023-07-30 RX ADMIN — DEXTROSE MONOHYDRATE: 50 INJECTION, SOLUTION INTRAVENOUS at 17:30

## 2023-07-30 RX ADMIN — LEVOTHYROXINE SODIUM 76 MCG: 20 INJECTION, SOLUTION INTRAVENOUS at 08:28

## 2023-07-30 RX ADMIN — HEPARIN SODIUM 5000 UNITS: 5000 INJECTION INTRAVENOUS; SUBCUTANEOUS at 21:55

## 2023-07-30 RX ADMIN — HEPARIN SODIUM 5000 UNITS: 5000 INJECTION INTRAVENOUS; SUBCUTANEOUS at 05:34

## 2023-07-30 ASSESSMENT — PAIN SCALES - WONG BAKER
WONGBAKER_NUMERICALRESPONSE: 0

## 2023-07-30 NOTE — PLAN OF CARE
used       Problem: Neurosensory - Adult  Goal: Achieves stable or improved neurological status  Outcome: Progressing  Flowsheets (Taken 7/29/2023 1941)  Achieves stable or improved neurological status:   Assess for and report changes in neurological status   Monitor temperature, glucose, and sodium. Initiate appropriate interventions as ordered     Problem: Respiratory - Adult  Goal: Achieves optimal ventilation and oxygenation  Outcome: Progressing  Flowsheets (Taken 7/29/2023 1941)  Achieves optimal ventilation and oxygenation:   Assess for changes in respiratory status   Assess for changes in mentation and behavior   Position to facilitate oxygenation and minimize respiratory effort   Oxygen supplementation based on oxygen saturation or arterial blood gases   Encourage broncho-pulmonary hygiene including cough, deep breathe, incentive spirometry   Assess and instruct to report shortness of breath or any respiratory difficulty   Respiratory therapy support as indicated     Problem: Cardiovascular - Adult  Goal: Maintains optimal cardiac output and hemodynamic stability  Outcome: Progressing  Flowsheets (Taken 7/29/2023 1941)  Maintains optimal cardiac output and hemodynamic stability:   Monitor blood pressure and heart rate   Monitor urine output and notify Licensed Independent Practitioner for values outside of normal range   Assess for signs of decreased cardiac output     Problem: Skin/Tissue Integrity - Adult  Goal: Skin integrity remains intact  Outcome: Progressing  Flowsheets  Taken 7/29/2023 2247  Skin Integrity Remains Intact: --  Taken 7/29/2023 1941  Skin Integrity Remains Intact: Monitor for areas of redness and/or skin breakdown  Note: Skin assessment completed every shift. Pt assessed for incontinence, appropriate barrier cream applied prn. Pt encouraged to turn/rotate every 2 hours. Assistance provided if pt unable to do so themselves.       Goal: Incisions, wounds, or drain sites healing without S/S of infection  Recent Flowsheet Documentation  Taken 7/29/2023 2247 by Candi Millan RN  Incisions, Wounds, or Drain Sites Healing Without Sign and Symptoms of Infection: TWICE DAILY: Assess and document skin integrity     Problem: Musculoskeletal - Adult  Goal: Return mobility to safest level of function  Outcome: Progressing  Flowsheets (Taken 7/29/2023 1941)  Return Mobility to Safest Level of Function:   Assess patient stability and activity tolerance for standing, transferring and ambulating with or without assistive devices   Assist with transfers and ambulation using safe patient handling equipment as needed     Problem: Genitourinary - Adult  Goal: Urinary catheter remains patent  Outcome: Progressing  Flowsheets (Taken 7/29/2023 1941)  Urinary catheter remains patent: Assess patency of urinary catheter     Problem: Infection - Adult  Goal: Absence of infection at discharge  Outcome: Progressing  Flowsheets (Taken 7/29/2023 1941)  Absence of infection at discharge:   Assess and monitor for signs and symptoms of infection   Monitor lab/diagnostic results   Monitor all insertion sites i.e., indwelling lines, tubes and drains   Administer medications as ordered   Instruct and encourage patient and family to use good hand hygiene technique     Problem: Metabolic/Fluid and Electrolytes - Adult  Goal: Electrolytes maintained within normal limits  Outcome: Progressing  Flowsheets (Taken 7/29/2023 1941)  Electrolytes maintained within normal limits:   Monitor labs and assess patient for signs and symptoms of electrolyte imbalances   Administer electrolyte replacement as ordered   Monitor response to electrolyte replacements, including repeat lab results as appropriate  Goal: Hemodynamic stability and optimal renal function maintained  Outcome: Progressing  Flowsheets (Taken 7/29/2023 1941)  Hemodynamic stability and optimal renal function maintained:   Monitor labs and assess for signs and symptoms of volume

## 2023-07-30 NOTE — PROGRESS NOTES
V2.0    [facility] [place] Note      Name:  Shon Rodriguez /Age/Sex: 1945  (65 y.o. female)   MRN & CSN:  [id number] & [id number] Encounter Date/Time: 2023 8:59 AM EDT   Location:  [address]5022[address]28 PCP: Neena Herman MD     Attending:Houssam Claudell Bowler, MD       Hospital Day: 6    Assessment and Recommendations   Shon Rodriguez is a 65 y.o. female who presents with DENISE (acute kidney injury) ([address])      Plan:       Hemolytic anemia, Procrit ordered, discussed multiple times with hematology, relatively stable for now. UTI started antibiotics pending culture so far urine culture positive Pseudomonas and E. coli, patient was on cefepime even though patient presented with her encephalopathy,  changed cephalosporins especially cefepime to meropenem. CKD nephrology consulted creatinine 1.8 this morning  Acute metabolic encephalopathy on top of chronic change in mental status, monitor closely, ordered CT head, ordered VBG, neurology consulted, mental status fluctuating. Goals of care being discussed patient has been reflect wishes to change CODE STATUS to DNR CCA, discussed in details, changes ordered  Acute kidney injury nephrology on board, creatinine decreased to 1.8 this morning. Generalized weakness due to above PT OT patient not participating  History of stroke as above, family requesting follow-up after discharge with stroke center neurology consulted will follow on further recommendations  Thrombocytopenia monitor for now, as this morning at 79  Hypernatremia D5  Hypoglycemia, hypoglycemia protocol added  Increased troponin, due to acute kidney injury and CKD  Hypothyroidism on supplement      Diet ADULT DIET; Regular; 3 carb choices (45 gm/meal); Low Sodium (2 gm)   DVT Prophylaxis [] Lovenox, []  Heparin, [] SCDs, [] Ambulation,  [] Eliquis, [] Xarelto  [] Coumadin   Code Status DNR-CCA             Personally reviewed Lab Studies and Imaging                   Medical Decision Making:   The of [facility] infarcts are redemonstrated and unchanged. ORBITS: The visualized portion of the orbits demonstrate no acute abnormality. SINUSES: The visualized paranasal sinuses and mastoid air cells demonstrate no acute abnormality. SOFT TISSUES/SKULL:  No acute abnormality of the visualized skull or soft tissues. No acute intracranial abnormality. CBC:   Recent Labs     07/28/23  0421 07/28/23  1906 07/29/23  0530 07/30/23  0545   WBC 4.8  --  3.9* 3.4*   HGB 6.6* 8.5* 8.1* 7.9*   PLT 75*  --  79* 92*     BMP:    Recent Labs     07/29/23  1816 07/30/23  0034 07/30/23  0545   * 152* 152*   K 4.8 4.7 4.2   * 115* 116*   CO2 21 27 29   BUN 47* 43* 38*   CREATININE 1.8* 1.8* 1.8*   GLUCOSE 103* 106* 104*     Hepatic:   Recent Labs     07/28/23  0421   AST 17   ALT 13   BILITOT 0.4   ALKPHOS 47     Lipids: No results found for: CHOL, HDL, TRIG  Hemoglobin A1C: No results found for: LABA1C  TSH: No results found for: TSH  Troponin: No results found for: TROPONINT  Lactic Acid: No results for input(s): LACTA in the last 72 hours. BNP: No results for input(s): PROBNP in the last 72 hours.   UA:  Lab Results   Component Value Date/Time    NITRU Negative 07/26/2023 02:00 PM    COLORU Yellow 07/26/2023 02:00 PM    PHUR 5.0 07/26/2023 02:00 PM    WBCUA 30 07/26/2023 02:00 PM    RBCUA 1 07/26/2023 02:00 PM    BACTERIA None Seen 07/26/2023 02:00 PM    CLARITYU Clear 07/26/2023 02:00 PM    SPECGRAV 1.012 07/26/2023 02:00 PM    LEUKOCYTESUR MODERATE 07/26/2023 02:00 PM    UROBILINOGEN 0.2 07/26/2023 02:00 PM    BILIRUBINUR Negative 07/26/2023 02:00 PM    BLOODU Negative 07/26/2023 02:00 PM    GLUCOSEU Negative 07/26/2023 02:00 PM    KETUA TRACE 07/26/2023 02:00 PM     Urine Cultures:   Lab Results   Component Value Date/Time    LABURIN >100,000 CFU/ml 07/25/2023 04:40 PM    LABURIN >100,000 CFU/ml 07/25/2023 04:40 PM     Blood Cultures: No results found for: BC  No results found for: BLOODCULT2  Organism:

## 2023-07-31 LAB
ANION GAP SERPL CALCULATED.3IONS-SCNC: 5 MMOL/L (ref 3–16)
ANION GAP SERPL CALCULATED.3IONS-SCNC: 5 MMOL/L (ref 3–16)
ANION GAP SERPL CALCULATED.3IONS-SCNC: 6 MMOL/L (ref 3–16)
ANION GAP SERPL CALCULATED.3IONS-SCNC: 7 MMOL/L (ref 3–16)
BUN SERPL-MCNC: 32 MG/DL (ref 7–20)
BUN SERPL-MCNC: 33 MG/DL (ref 7–20)
BUN SERPL-MCNC: 34 MG/DL (ref 7–20)
BUN SERPL-MCNC: 34 MG/DL (ref 7–20)
CALCIUM SERPL-MCNC: 8.5 MG/DL (ref 8.3–10.6)
CALCIUM SERPL-MCNC: 8.7 MG/DL (ref 8.3–10.6)
CALCIUM SERPL-MCNC: 8.7 MG/DL (ref 8.3–10.6)
CALCIUM SERPL-MCNC: 8.8 MG/DL (ref 8.3–10.6)
CHLORIDE SERPL-SCNC: 106 MMOL/L (ref 99–110)
CHLORIDE SERPL-SCNC: 106 MMOL/L (ref 99–110)
CHLORIDE SERPL-SCNC: 110 MMOL/L (ref 99–110)
CHLORIDE SERPL-SCNC: 112 MMOL/L (ref 99–110)
CO2 SERPL-SCNC: 27 MMOL/L (ref 21–32)
CO2 SERPL-SCNC: 28 MMOL/L (ref 21–32)
CO2 SERPL-SCNC: 29 MMOL/L (ref 21–32)
CO2 SERPL-SCNC: 30 MMOL/L (ref 21–32)
CREAT SERPL-MCNC: 1.4 MG/DL (ref 0.6–1.2)
CREAT SERPL-MCNC: 1.4 MG/DL (ref 0.6–1.2)
CREAT SERPL-MCNC: 1.5 MG/DL (ref 0.6–1.2)
CREAT SERPL-MCNC: 1.5 MG/DL (ref 0.6–1.2)
DEPRECATED RDW RBC AUTO: 18.3 % (ref 12.4–15.4)
GFR SERPLBLD CREATININE-BSD FMLA CKD-EPI: 35 ML/MIN/{1.73_M2}
GFR SERPLBLD CREATININE-BSD FMLA CKD-EPI: 35 ML/MIN/{1.73_M2}
GFR SERPLBLD CREATININE-BSD FMLA CKD-EPI: 38 ML/MIN/{1.73_M2}
GFR SERPLBLD CREATININE-BSD FMLA CKD-EPI: 38 ML/MIN/{1.73_M2}
GLUCOSE BLD-MCNC: 112 MG/DL (ref 70–99)
GLUCOSE BLD-MCNC: 115 MG/DL (ref 70–99)
GLUCOSE BLD-MCNC: 116 MG/DL (ref 70–99)
GLUCOSE BLD-MCNC: 133 MG/DL (ref 70–99)
GLUCOSE SERPL-MCNC: 101 MG/DL (ref 70–99)
GLUCOSE SERPL-MCNC: 103 MG/DL (ref 70–99)
GLUCOSE SERPL-MCNC: 110 MG/DL (ref 70–99)
GLUCOSE SERPL-MCNC: 130 MG/DL (ref 70–99)
HCT VFR BLD AUTO: 22.2 % (ref 36–48)
HGB BLD-MCNC: 7.5 G/DL (ref 12–16)
MAGNESIUM SERPL-MCNC: 1.7 MG/DL (ref 1.8–2.4)
MCH RBC QN AUTO: 32.2 PG (ref 26–34)
MCHC RBC AUTO-ENTMCNC: 33.7 G/DL (ref 31–36)
MCV RBC AUTO: 95.8 FL (ref 80–100)
PERFORMED ON: ABNORMAL
PLATELET # BLD AUTO: 100 K/UL (ref 135–450)
PMV BLD AUTO: 7.1 FL (ref 5–10.5)
POTASSIUM SERPL-SCNC: 4.1 MMOL/L (ref 3.5–5.1)
POTASSIUM SERPL-SCNC: 4.2 MMOL/L (ref 3.5–5.1)
POTASSIUM SERPL-SCNC: 4.2 MMOL/L (ref 3.5–5.1)
POTASSIUM SERPL-SCNC: 4.3 MMOL/L (ref 3.5–5.1)
PROT PATTERN UR ELPH-IMP: NORMAL
RBC # BLD AUTO: 2.31 M/UL (ref 4–5.2)
SODIUM SERPL-SCNC: 140 MMOL/L (ref 136–145)
SODIUM SERPL-SCNC: 141 MMOL/L (ref 136–145)
SODIUM SERPL-SCNC: 144 MMOL/L (ref 136–145)
SODIUM SERPL-SCNC: 146 MMOL/L (ref 136–145)
WBC # BLD AUTO: 3.4 K/UL (ref 4–11)

## 2023-07-31 PROCEDURE — 80048 BASIC METABOLIC PNL TOTAL CA: CPT

## 2023-07-31 PROCEDURE — 94760 N-INVAS EAR/PLS OXIMETRY 1: CPT

## 2023-07-31 PROCEDURE — 97535 SELF CARE MNGMENT TRAINING: CPT

## 2023-07-31 PROCEDURE — 6360000002 HC RX W HCPCS: Performed by: INTERNAL MEDICINE

## 2023-07-31 PROCEDURE — 83735 ASSAY OF MAGNESIUM: CPT

## 2023-07-31 PROCEDURE — 85027 COMPLETE CBC AUTOMATED: CPT

## 2023-07-31 PROCEDURE — 36592 COLLECT BLOOD FROM PICC: CPT

## 2023-07-31 PROCEDURE — 2580000003 HC RX 258: Performed by: INTERNAL MEDICINE

## 2023-07-31 PROCEDURE — 2060000000 HC ICU INTERMEDIATE R&B

## 2023-07-31 PROCEDURE — 97530 THERAPEUTIC ACTIVITIES: CPT

## 2023-07-31 PROCEDURE — 2500000003 HC RX 250 WO HCPCS: Performed by: INTERNAL MEDICINE

## 2023-07-31 RX ADMIN — HEPARIN SODIUM 5000 UNITS: 5000 INJECTION INTRAVENOUS; SUBCUTANEOUS at 05:54

## 2023-07-31 RX ADMIN — LEVOTHYROXINE SODIUM 76 MCG: 20 INJECTION, SOLUTION INTRAVENOUS at 09:11

## 2023-07-31 RX ADMIN — SODIUM CHLORIDE, PRESERVATIVE FREE 10 ML: 5 INJECTION INTRAVENOUS at 22:10

## 2023-07-31 RX ADMIN — HEPARIN SODIUM 5000 UNITS: 5000 INJECTION INTRAVENOUS; SUBCUTANEOUS at 22:10

## 2023-07-31 RX ADMIN — MEROPENEM 1000 MG: 1 INJECTION, POWDER, FOR SOLUTION INTRAVENOUS at 01:56

## 2023-07-31 RX ADMIN — MEROPENEM 1000 MG: 1 INJECTION, POWDER, FOR SOLUTION INTRAVENOUS at 13:52

## 2023-07-31 RX ADMIN — HEPARIN SODIUM 5000 UNITS: 5000 INJECTION INTRAVENOUS; SUBCUTANEOUS at 13:51

## 2023-07-31 ASSESSMENT — ENCOUNTER SYMPTOMS: SHORTNESS OF BREATH: 0

## 2023-07-31 ASSESSMENT — PAIN SCALES - WONG BAKER: WONGBAKER_NUMERICALRESPONSE: 0

## 2023-07-31 NOTE — PROGRESS NOTES
Physical Therapy  Facility/Department: [address] PROGRESSIVE CARE  Daily Treatment Note - COTX  NAME: Ezio Carter  : 1945  MRN: [id number]    Date of Service: 2023    Discharge Recommendations:  Patient would benefit from continued therapy after discharge   PT Equipment Recommendations  Equipment Needed: No    Patient Diagnosis(es): The primary encounter diagnosis was Hypotension, unspecified hypotension type. Diagnoses of Anemia, unspecified type, Acute kidney injury superimposed on chronic kidney disease ([address]), Thrombocytopenia ([address]), Elevated troponin, Hyperkalemia, Goals of care, counseling/discussion, and Urinary tract infection associated with indwelling urethral catheter, initial encounter [facility]) were also pertinent to this visit. Assessment   Assessment: Pt lethargic, minimally able to participate in session. PT noted very limited use of UEs and LEs. Pt required total assist for bed mobility, up to Max A to maintain sitting balance EOB. It is unclear how much she was moving PTA. She would benefit from continued therapy to maximize strength and independence with mobility tasks. Recommend continued therapy at low-moderate frequency upon D/C. Ezio Carter scored a 7/24 on the AM-PAC short mobility form. Current research shows that an AM-PAC score of 17 or less is typically not associated with a discharge to the patient's home setting. Based on the patient's AM-PAC score and their current functional mobility deficits, it is recommended that the patient have 3-5 sessions per week of Physical Therapy at d/c to increase the patient's independence. Please see assessment section for further patient specific details. If patient discharges prior to next session this note will serve as a discharge summary. Please see below for the latest assessment towards goals.      Activity Tolerance: Treatment limited secondary to decreased cognition;Patient limited by fatigue  Equipment Needed: No Plan    Physcial Therapy Plan  General Plan: 3-5 times per week  Specific Instructions for Next Treatment: cotx  Current Treatment Recommendations: Functional mobility training     Restrictions  Restrictions/Precautions  Restrictions/Precautions: Fall Risk  Position Activity Restriction  Other position/activity restrictions: samaniego cath, h/o cva ~3 wks ago/aphasia. Subjective    Subjective  Subjective: Pt lethargic, aphasic. Agreeable to activity with encouragement. Very limited use of UEs and LEs. Objective     Bed Mobility Training  Bed Mobility Training: Yes  Overall Level of Assistance: Total assistance;Assist X2  Rolling: Assist X2;Total assistance  Supine to Sit: Total assistance;Assist X2  Sit to Supine: Assist X2;Total assistance  Scooting: Total assistance;Assist X2    Balance  Sitting: With support  Standing: With support  Transfer Training  Transfer Training: No     PT Exercises  Exercise Treatment: Seated EOB: pt attempted face washing. Could not complete without Max A. She required up to Max A to maintain sitting balance EOB, experiencing progressive lean to R leading to increased LOB. Other Specialty Interventions  Other Treatments/Modalities: Total assist x 2 for rolling, positioning maxi-sling. Pt required total assist to transfer to bedside chair using maxi move. Safety Devices  Type of Devices: All fall risk precautions in place;Call light within reach; Chair alarm in place; Left in chair;Gait belt;Nurse notified  Restraints  Restraints Initially in Place: No     Goals  Short Term Goals  Time Frame for Short Term Goals: by acute d/c--anticipate ongoing therapy post acute setting. Short Term Goal 1: bed mobility Max A x 2 to eob. Short Term Goal 2: sitting eob > 3 minutes with Mod A x 1 .   Short Term Goal 3: assess transfers with stedy lift / as able, A x 2  Short Term Goal 4: tolerate sitting up in recliner x 1 hr.  Long Term Goals  Time Frame for Long Term Goals : tbd at next

## 2023-07-31 NOTE — PROGRESS NOTES
V2.0    [facility] [place]      Name:  Jacklyn Ghosh /Age/Sex: 1945  (65 y.o. female)   MRN & CSN:  [id number] & [id number] Encounter Date/Time: 2023 12:40 PM EDT   Location:  [id number]-1653/5279-01 PCP: Veronika Frey MD     Attending:Victoriano Mills MD       Hospital Day: 7    Assessment and Recommendations   Jacklyn Ghosh is a 65 y.o. female who presents with DENISE (acute kidney injury) ([address])      Plan:       Hemolytic anemia, Procrit ordered, discussed multiple times with hematology, relatively continue to be stable for now. UTI started antibiotics pending culture so far urine culture positive Pseudomonas and E. coli, patient was on cefepime even though patient presented with her encephalopathy,  changed cephalosporins especially cefepime to meropenem. We will keep on meropenem for now 2 more days  CKD nephrology consulted creatinine down to 1.5   Acute metabolic encephalopathy on top of chronic change in mental status, monitor closely, ordered CT head, ordered VBG, neurology consulted, mental status fluctuating. Improvement noted today patient up to chair continue to monitor  Acute kidney injury nephrology on board, creatinine decreased. Today still at 1.5  Generalized weakness due to above PT OT patient not participating  History of stroke as above, family requesting follow-up after discharge with stroke center neurology consulted will follow on further recommendations  Thrombocytopenia monitor for now, minimal improvement noted platelet count at 261  Hypernatremia D5, improving  Hypoglycemia, hypoglycemia protocol added on D5 currently  Increased troponin, due to acute kidney injury and CKD  Hypothyroidism on supplement      Diet ADULT DIET; Regular; 3 carb choices (45 gm/meal);  Low Sodium (2 gm)   DVT Prophylaxis [] Lovenox, []  Heparin, [] SCDs, [] Ambulation,  [] Eliquis, [] Xarelto  [] Coumadin   Code Status DNR-CCA             Personally reviewed Lab Studies and Imaging IntraVENous Q12H    epoetin jay-epbx  40,000 Units SubCUTAneous Q7 Days    sodium chloride flush  5-40 mL IntraVENous 2 times per day    heparin (porcine)  5,000 Units SubCUTAneous 3 times per day      Infusions:    dextrose 100 mL/hr at 07/31/23 0458    sodium chloride      dextrose      sodium chloride       PRN Meds: sodium chloride, , PRN  glucose, 4 tablet, PRN  dextrose bolus, 125 mL, PRN   Or  dextrose bolus, 250 mL, PRN  glucagon (rDNA), 1 mg, PRN  dextrose, , Continuous PRN  sodium chloride flush, 5-40 mL, PRN  sodium chloride, , PRN  ondansetron, 4 mg, Q8H PRN   Or  ondansetron, 4 mg, Q6H PRN  bisacodyl, 5 mg, Daily PRN  aluminum & magnesium hydroxide-simethicone, 30 mL, Q6H PRN  acetaminophen, 650 mg, Q6H PRN   Or  acetaminophen, 650 mg, Q6H PRN        Labs and Imaging   CT ABDOMEN PELVIS WO CONTRAST Additional Contrast? None    Result Date: 7/25/2023  EXAMINATION: CT OF THE ABDOMEN AND PELVIS WITHOUT CONTRAST 7/25/2023 1:25 pm TECHNIQUE: CT of the abdomen and pelvis was performed without the administration of intravenous contrast. Multiplanar reformatted images are provided for review. Automated exposure control, iterative reconstruction, and/or weight based adjustment of the mA/kV was utilized to reduce the radiation dose to as low as reasonably achievable. COMPARISON: None. HISTORY: ORDERING SYSTEM PROVIDED HISTORY: lower abd pain TECHNOLOGIST PROVIDED HISTORY: Reason for exam:->lower abd pain Additional Contrast?->None Reason for Exam: lower abd pain, septic, acute renal failure FINDINGS: Lower Chest: There is subsegmental atelectasis at the lung bases. There is no pleural effusion. Organs: The liver and spleen are unremarkable. The gallbladder is contracted or absent. A left adrenal nodule measures up to 2.1 cm versus 1 cm previously. It contains a focus of macroscopic fat. The right adrenal gland and pancreas are unremarkable. The left kidney is again atrophic.   There are small hyperdense

## 2023-07-31 NOTE — PLAN OF CARE
Problem: Discharge Planning  Goal: Discharge to home or other facility with appropriate resources  Outcome: Progressing     Problem: Skin/Tissue Integrity  Goal: Absence of new skin breakdown  Description: 1. Monitor for areas of redness and/or skin breakdown  2. Assess vascular access sites hourly  3. Every 4-6 hours minimum:  Change oxygen saturation probe site  4. Every 4-6 hours:  If on nasal continuous positive airway pressure, respiratory therapy assess nares and determine need for appliance change or resting period.   Outcome: Progressing     Problem: Safety - Adult  Goal: Free from fall injury  Outcome: Progressing     Problem: Pain  Goal: Verbalizes/displays adequate comfort level or baseline comfort level  Outcome: Progressing     Problem: Neurosensory - Adult  Goal: Achieves stable or improved neurological status  Outcome: Progressing     Problem: Respiratory - Adult  Goal: Achieves optimal ventilation and oxygenation  Outcome: Progressing     Problem: Cardiovascular - Adult  Goal: Maintains optimal cardiac output and hemodynamic stability  Outcome: Progressing     Problem: Skin/Tissue Integrity - Adult  Goal: Skin integrity remains intact  Outcome: Progressing  Goal: Incisions, wounds, or drain sites healing without S/S of infection  Outcome: Progressing     Problem: Musculoskeletal - Adult  Goal: Return mobility to safest level of function  Outcome: Progressing     Problem: Genitourinary - Adult  Goal: Absence of urinary retention  Outcome: Progressing  Goal: Urinary catheter remains patent  Outcome: Progressing     Problem: Infection - Adult  Goal: Absence of infection at discharge  Outcome: Progressing     Problem: Hematologic - Adult  Goal: Maintains hematologic stability  Outcome: Progressing     Problem: Gastrointestinal - Adult  Goal: Minimal or absence of nausea and vomiting  Outcome: Progressing  Goal: Maintains or returns to baseline bowel function  Outcome: Progressing

## 2023-07-31 NOTE — CARE COORDINATION
SW reviewed chart, and nursing rounds completed. Pt's labs still being done, Palliative to see, pt is from [facility], and plan is to return. Waiting on clearances from nehro, hematology, and GI. SW to follow.     Rowena Vazquez LMSW, [address] [place] [address] Social Work Case Management   Phone: [phone]  Fax: [phone]

## 2023-07-31 NOTE — PROGRESS NOTES
Occupational Therapy  Facility/Department: [address] PROGRESSIVE CARE  Occupational Therapy Treatment and Tentative D/C      Name: Praveen Huitron  : 1945  MRN: [id number]  Date of Service: 2023    Discharge Recommendations: Praveen Huitron scored a 8/24 on the AM-PAC ADL Inpatient form. Current research shows that an AM-PAC score of 17 or less is typically not associated with a discharge to the patient's home setting. Based on the patient's AM-PAC score and their current ADL deficits, it is recommended that the patient have 3-5 sessions per week of Occupational Therapy at d/c to increase the patient's independence. Please see assessment section for further patient specific details. If patient discharges prior to next session this note will serve as a discharge summary. Please see below for the latest assessment towards goals. 3-5 sessions per week, Patient would benefit from continued therapy after discharge  OT Equipment Recommendations  Equipment Needed: No       Patient Diagnosis(es): The primary encounter diagnosis was Hypotension, unspecified hypotension type. Diagnoses of Anemia, unspecified type, Acute kidney injury superimposed on chronic kidney disease ([address]), Thrombocytopenia ([address]), Elevated troponin, Hyperkalemia, Goals of care, counseling/discussion, and Urinary tract infection associated with indwelling urethral catheter, initial encounter [facility]) were also pertinent to this visit. Past Medical History:  has a past medical history of Anemia, Arthritis, Chronic kidney disease, H/O bladder problems, Hyperlipidemia, Hypertension, and Thyroid disease. Past Surgical History:  has a past surgical history that includes knee surgery; Hysterectomy; Dilation and curettage of uterus; Appendectomy; Cholecystectomy; IR BIOPSY BONE MARROW (2022); and CT BIOPSY BONE MARROW (2022). Assessment   Performance deficits / Impairments: Decreased functional mobility ; Decreased ADL

## 2023-07-31 NOTE — PROGRESS NOTES
PALLIATIVE MEDICINE PROGRESS NOTE     Patient name:Sherry Hendricks    RA :1945  Room/Bed:N6N-6576/5279-01    LOS: 6 days        ASSESSMENT/RECOMMENDATIONS     65 y.o. female with anemia, altered mental status and debility secondary to prior CVA. Anemia -history of chronic hemolytic anemia, oncology following. GI signed off. Hematology stating can only do supportive care. Hgb continues to trend down, will continue procrit as well as transfusions as directed. Altered mental status -Continues to improve. Debility -secondary to CVA. PT OT ordered, not able to participate in care. DENISE on CKD -nephrology consulted. No current plan for RRT. Stable. Goals of Care -see below. Patient/Family Goals of Care :    23 - Conversation with the patient and  at the bedside, patient minimally participatory. We discussed goals of care in the setting of the patient's decline since CVA. We reviewed CODE STATUS options as well as potential placement versus home in the future. Patient's  unable to verbalize goals. When addressing CODE STATUS, he states he would like to have that discussion with the patient when she is \"lucid\". Reminded that the patient has not been able to have a quality conversation prior to her CVA, may be her baseline. We reviewed that unfortunately in some circumstances, the patient's next of kin does have to make these decisions and the patient is unable. The patient's  became tearful, he understands that this is a grim situation, however is not able to make these decisions at this time. We discussed quality of life over quantity. Unfortunately, the patient's  has a minimal support system. He does have 2 sons from a prior marriage, encouraged him to reach out to them for support. We will continue to have gentle conversations as the plan unfolds. 23 - Reviewed Goals of care with the patient, niece, and RN at bedside.   We reviewed overall prognosis, neurology and oncology thought process. Although mental status is improving, patients overall health is not. Patient was alert and able to answer questions appropriately. Niece asked the patient if she wants to continue this care, the patient stated \"no\". Spoke with  over the phone, again reviewed documentation and lab work. Overall prognosis is poor given several co-morbilities. We reviewed code status options as well as hospice vs aggressive management.  states, \"I need to think about all this\". He is requesting a call on 7/29/23 to review wishes, attending and Dr. Elena Lechuga made aware. 7/31/23 - Spoke with the patient regarding goals of care. Patient was able to have full conversation noting understanding. When questioned if she would like to continue aggressive medical management and hospitalization, the patient patient answered \"No\". When asked if she would like a feeding tube placed to assist in nutrition to promote healing, the patient answered \"No\". I asked the patient if she was able to have conversations regarding her care, she states \"a little bit\". She was able to acknowledge that her  was struggling, but her desires remain consistent. Attempted to speak with the  in person x 4, he was not in the room today. Will continue to follow up. Disposition/Discharge Plan :    Pending. Advance Directives:  Code status:  DNR-CCA  Decision Maker: Patient/. Case Discussed with:  patient, Yue MIKI Michelle. Thank you for allowing us to participate in the care of this patient. SUBJECTIVE     Chief Complaint: Low hemoglobin     Last 24 hours:   No acute events overnight. Patient more responsive, able to answer questions appropriately. Up to a chair. ROS:    Review of Systems   Constitutional:  Positive for activity change and fatigue. Respiratory:  Negative for shortness of breath. Cardiovascular:  Negative for chest pain.

## 2023-08-01 LAB
ANION GAP SERPL CALCULATED.3IONS-SCNC: 5 MMOL/L (ref 3–16)
ANION GAP SERPL CALCULATED.3IONS-SCNC: 7 MMOL/L (ref 3–16)
BUN SERPL-MCNC: 27 MG/DL (ref 7–20)
BUN SERPL-MCNC: 28 MG/DL (ref 7–20)
BUN SERPL-MCNC: 28 MG/DL (ref 7–20)
BUN SERPL-MCNC: 29 MG/DL (ref 7–20)
CALCIUM SERPL-MCNC: 8.2 MG/DL (ref 8.3–10.6)
CALCIUM SERPL-MCNC: 8.4 MG/DL (ref 8.3–10.6)
CALCIUM SERPL-MCNC: 8.5 MG/DL (ref 8.3–10.6)
CALCIUM SERPL-MCNC: 8.5 MG/DL (ref 8.3–10.6)
CHLORIDE SERPL-SCNC: 104 MMOL/L (ref 99–110)
CHLORIDE SERPL-SCNC: 104 MMOL/L (ref 99–110)
CHLORIDE SERPL-SCNC: 106 MMOL/L (ref 99–110)
CHLORIDE SERPL-SCNC: 107 MMOL/L (ref 99–110)
CO2 SERPL-SCNC: 27 MMOL/L (ref 21–32)
CO2 SERPL-SCNC: 27 MMOL/L (ref 21–32)
CO2 SERPL-SCNC: 28 MMOL/L (ref 21–32)
CO2 SERPL-SCNC: 28 MMOL/L (ref 21–32)
CREAT SERPL-MCNC: 1.3 MG/DL (ref 0.6–1.2)
CREAT SERPL-MCNC: 1.4 MG/DL (ref 0.6–1.2)
DEPRECATED RDW RBC AUTO: 18.1 % (ref 12.4–15.4)
GFR SERPLBLD CREATININE-BSD FMLA CKD-EPI: 38 ML/MIN/{1.73_M2}
GFR SERPLBLD CREATININE-BSD FMLA CKD-EPI: 42 ML/MIN/{1.73_M2}
GLUCOSE BLD-MCNC: 107 MG/DL (ref 70–99)
GLUCOSE BLD-MCNC: 113 MG/DL (ref 70–99)
GLUCOSE BLD-MCNC: 114 MG/DL (ref 70–99)
GLUCOSE BLD-MCNC: 116 MG/DL (ref 70–99)
GLUCOSE BLD-MCNC: 116 MG/DL (ref 70–99)
GLUCOSE BLD-MCNC: 126 MG/DL (ref 70–99)
GLUCOSE SERPL-MCNC: 101 MG/DL (ref 70–99)
GLUCOSE SERPL-MCNC: 103 MG/DL (ref 70–99)
GLUCOSE SERPL-MCNC: 124 MG/DL (ref 70–99)
GLUCOSE SERPL-MCNC: 99 MG/DL (ref 70–99)
HCT VFR BLD AUTO: 20.6 % (ref 36–48)
HGB BLD-MCNC: 7 G/DL (ref 12–16)
MCH RBC QN AUTO: 32.2 PG (ref 26–34)
MCHC RBC AUTO-ENTMCNC: 33.9 G/DL (ref 31–36)
MCV RBC AUTO: 95.1 FL (ref 80–100)
PERFORMED ON: ABNORMAL
PLATELET # BLD AUTO: 99 K/UL (ref 135–450)
PMV BLD AUTO: 7.2 FL (ref 5–10.5)
POTASSIUM SERPL-SCNC: 4 MMOL/L (ref 3.5–5.1)
POTASSIUM SERPL-SCNC: 4 MMOL/L (ref 3.5–5.1)
POTASSIUM SERPL-SCNC: 4.1 MMOL/L (ref 3.5–5.1)
POTASSIUM SERPL-SCNC: 4.1 MMOL/L (ref 3.5–5.1)
RBC # BLD AUTO: 2.17 M/UL (ref 4–5.2)
SODIUM SERPL-SCNC: 138 MMOL/L (ref 136–145)
SODIUM SERPL-SCNC: 139 MMOL/L (ref 136–145)
SODIUM SERPL-SCNC: 140 MMOL/L (ref 136–145)
SODIUM SERPL-SCNC: 140 MMOL/L (ref 136–145)
WBC # BLD AUTO: 3.1 K/UL (ref 4–11)

## 2023-08-01 PROCEDURE — 6360000002 HC RX W HCPCS: Performed by: INTERNAL MEDICINE

## 2023-08-01 PROCEDURE — 94760 N-INVAS EAR/PLS OXIMETRY 1: CPT

## 2023-08-01 PROCEDURE — 85027 COMPLETE CBC AUTOMATED: CPT

## 2023-08-01 PROCEDURE — 80048 BASIC METABOLIC PNL TOTAL CA: CPT

## 2023-08-01 PROCEDURE — 36592 COLLECT BLOOD FROM PICC: CPT

## 2023-08-01 PROCEDURE — 2060000000 HC ICU INTERMEDIATE R&B

## 2023-08-01 PROCEDURE — 2580000003 HC RX 258: Performed by: INTERNAL MEDICINE

## 2023-08-01 PROCEDURE — 2500000003 HC RX 250 WO HCPCS: Performed by: INTERNAL MEDICINE

## 2023-08-01 PROCEDURE — 6370000000 HC RX 637 (ALT 250 FOR IP): Performed by: INTERNAL MEDICINE

## 2023-08-01 RX ORDER — LEVOTHYROXINE SODIUM 0.07 MG/1
150 TABLET ORAL DAILY
Status: DISCONTINUED | OUTPATIENT
Start: 2023-08-02 | End: 2023-08-05 | Stop reason: HOSPADM

## 2023-08-01 RX ORDER — LORAZEPAM 0.5 MG/1
0.5 TABLET ORAL EVERY 6 HOURS PRN
Status: DISCONTINUED | OUTPATIENT
Start: 2023-08-01 | End: 2023-08-05 | Stop reason: HOSPADM

## 2023-08-01 RX ORDER — ASPIRIN 81 MG/1
81 TABLET ORAL DAILY
Status: DISCONTINUED | OUTPATIENT
Start: 2023-08-01 | End: 2023-08-05 | Stop reason: HOSPADM

## 2023-08-01 RX ORDER — FENOFIBRATE 54 MG/1
54 TABLET ORAL DAILY
Status: DISCONTINUED | OUTPATIENT
Start: 2023-08-01 | End: 2023-08-05 | Stop reason: HOSPADM

## 2023-08-01 RX ORDER — FOLIC ACID 1 MG/1
1000 TABLET ORAL DAILY
Status: DISCONTINUED | OUTPATIENT
Start: 2023-08-01 | End: 2023-08-05 | Stop reason: HOSPADM

## 2023-08-01 RX ORDER — ESTRADIOL 1 MG/1
2 TABLET ORAL DAILY
Status: DISCONTINUED | OUTPATIENT
Start: 2023-08-01 | End: 2023-08-05 | Stop reason: HOSPADM

## 2023-08-01 RX ORDER — ATORVASTATIN CALCIUM 20 MG/1
20 TABLET, FILM COATED ORAL NIGHTLY
Status: DISCONTINUED | OUTPATIENT
Start: 2023-08-01 | End: 2023-08-05 | Stop reason: HOSPADM

## 2023-08-01 RX ADMIN — SODIUM CHLORIDE, PRESERVATIVE FREE 10 ML: 5 INJECTION INTRAVENOUS at 21:12

## 2023-08-01 RX ADMIN — DEXTROSE MONOHYDRATE: 50 INJECTION, SOLUTION INTRAVENOUS at 01:08

## 2023-08-01 RX ADMIN — SODIUM CHLORIDE, PRESERVATIVE FREE 10 ML: 5 INJECTION INTRAVENOUS at 07:50

## 2023-08-01 RX ADMIN — DEXTROSE MONOHYDRATE: 50 INJECTION, SOLUTION INTRAVENOUS at 21:17

## 2023-08-01 RX ADMIN — MEROPENEM 1000 MG: 1 INJECTION, POWDER, FOR SOLUTION INTRAVENOUS at 14:34

## 2023-08-01 RX ADMIN — HEPARIN SODIUM 5000 UNITS: 5000 INJECTION INTRAVENOUS; SUBCUTANEOUS at 21:08

## 2023-08-01 RX ADMIN — MEROPENEM 1000 MG: 1 INJECTION, POWDER, FOR SOLUTION INTRAVENOUS at 02:04

## 2023-08-01 RX ADMIN — FOLIC ACID 1000 MCG: 1 TABLET ORAL at 15:33

## 2023-08-01 RX ADMIN — HEPARIN SODIUM 5000 UNITS: 5000 INJECTION INTRAVENOUS; SUBCUTANEOUS at 14:33

## 2023-08-01 RX ADMIN — HEPARIN SODIUM 5000 UNITS: 5000 INJECTION INTRAVENOUS; SUBCUTANEOUS at 06:42

## 2023-08-01 RX ADMIN — ESTRADIOL 2 MG: 1 TABLET ORAL at 15:33

## 2023-08-01 RX ADMIN — ATORVASTATIN CALCIUM 20 MG: 20 TABLET, FILM COATED ORAL at 21:08

## 2023-08-01 RX ADMIN — FENOFIBRATE 54 MG: 54 TABLET, FILM COATED ORAL at 15:33

## 2023-08-01 RX ADMIN — ASPIRIN 81 MG: 81 TABLET, COATED ORAL at 15:33

## 2023-08-01 RX ADMIN — LEVOTHYROXINE SODIUM 76 MCG: 20 INJECTION, SOLUTION INTRAVENOUS at 07:49

## 2023-08-01 ASSESSMENT — ENCOUNTER SYMPTOMS: SHORTNESS OF BREATH: 0

## 2023-08-01 ASSESSMENT — PAIN SCALES - WONG BAKER: WONGBAKER_NUMERICALRESPONSE: 0

## 2023-08-01 NOTE — PROGRESS NOTES
78*   < > 79* 86* 93*   CREATININE 1.4* 1.4* 1.4* 1.5* 1.5*   < > 1.5* 1.8* 1.8* 1.8* 2.0* 2.2* 2.5*   < > 2.7* 2.7* 2.8*   LABGLOM 38* 38* 38* 35* 35*   < > 35* 28* 28* 28* 25* 22* 19*   < > 17* 17* 17*   CALCIUM 8.5 8.7 8.7 8.5 8.8   < > 8.3 9.2 9.4 9.3 8.9 9.0 9.1   < > 8.7 8.8 8.9   PROT  --   --   --   --   --   --   --   --   --   --   --  5.7* 6.0*  --  5.8* 5.4* 5.7*   LABALBU  --   --   --   --   --   --  2.5* 2.8* 2.7* 2.5* 2.9* 3.0* 3.1*  --  2.7* 2.9* 3.0*   AGRATIO  --   --   --   --   --   --   --   --   --   --   --  1.1 1.1  --   --  1.2 1.1   BILITOT  --   --   --   --   --   --   --   --   --   --   --  0.4 0.5  --  0.4 0.4 0.5   ALKPHOS  --   --   --   --   --   --   --   --   --   --   --  47 51  --  45 48 49   ALT  --   --   --   --   --   --   --   --   --   --   --  13 16  --  16 14 16   AST  --   --   --   --   --   --   --   --   --   --   --  17 20  --  29 19 20   MG  --   --   --   --  1.70*  --   --  2.00  --   --  2.20  --   --   --   --   --   --     < > = values in this interval not displayed. Lab Results   Component Value Date    CALCIUM 8.5 08/01/2023    PHOS 1.7 (L) 07/30/2023       LDH:  Recent Labs     07/27/23  0451   *       Radiology Review:  CT HEAD WO CONTRAST  Narrative: EXAMINATION:  CT OF THE HEAD WITHOUT CONTRAST  7/28/2023 12:39 pm    TECHNIQUE:  CT of the head was performed without the administration of intravenous  contrast. Automated exposure control, iterative reconstruction, and/or weight  based adjustment of the mA/kV was utilized to reduce the radiation dose to as  low as reasonably achievable. COMPARISON:  06/30/2023    HISTORY:  ORDERING SYSTEM PROVIDED HISTORY: encephalopathy  TECHNOLOGIST PROVIDED HISTORY:  Reason for exam:->encephalopathy  Has a \"code stroke\" or \"stroke alert\" been called? ->No  Reason for Exam: encephalopathy    FINDINGS:  BRAIN/VENTRICLES: There is no acute intracranial hemorrhage, mass effect or  midline shift.   No abnormal extra-axial fluid collection. The gray-white  differentiation is maintained without evidence of an acute infarct. There is  no evidence of hydrocephalus. Chronic right temporal encephalomalacia. Subacute artery of [place] infarcts are redemonstrated and unchanged. ORBITS: The visualized portion of the orbits demonstrate no acute abnormality. SINUSES: The visualized paranasal sinuses and mastoid air cells demonstrate  no acute abnormality. SOFT TISSUES/SKULL:  No acute abnormality of the visualized skull or soft  tissues. Impression: No acute intracranial abnormality. Problem List  Patient Active Problem List   Diagnosis    Acquired autoimmune hemolytic anemia (HCC)    Anemia in CKD (chronic kidney disease)    Stage 3 chronic kidney disease (HCC)    DENISE (acute kidney injury) ([address])    Elevated troponin       ASSESSMENT AND PLAN:  1. Anemia. This is chronic and due to low-grade chronic hemolysis. even if we give this -I dont se eher improving to her bamarko   Procrit duetoday   Cbc can transfuse if less than 7      2. Acute kidney injury. monitered     3. Acute stroke.   There has not been significant clinical improvement I see no improvement since last week   She does not seem to want to have aggressive measures but  struggling to decide                Annetta Avery MD  Please contact through [phone] [place]

## 2023-08-01 NOTE — PROGRESS NOTES
Physical Therapy  Felice Arroyo  Therapy attempt. Pt with low H&H(7). Discussed with nursing. Will hold therapy this date and attempt tomorrow as able. Refer to the last note for status if pt is discharged.   Vic Ernst PT, DPT, [id number]

## 2023-08-01 NOTE — PROGRESS NOTES
PALLIATIVE MEDICINE PROGRESS NOTE     Patient name:Sherry Ibarra    UXL:[id number] :1945  Room/Bed:F4G-5448/5279-01    LOS: 7 days        ASSESSMENT/RECOMMENDATIONS     65 y.o. female with anemia, altered mental status and debility secondary to prior CVA. Anemia -history of chronic hemolytic anemia, oncology following. GI signed off. Hematology stating can only do supportive care. Hgb continues to trend down, will continue procrit as well as transfusions as directed. Will likely need transfusion tomorrow. Altered mental status -Continues to improve. Alert times self, place, and time of year. Debility -secondary to CVA. PT OT ordered, now able to participate in care. DENISE on CKD -nephrology consulted. No current plan for RRT. Stable. Severe protein calorie malnutrition -p.o. intake minimal, last albumin on 2023 was 2.5. Discussed feeding tube for additional nutrition support, patient declined. Goals of Care -see below. Patient/Family Goals of Care :    23 - Conversation with the patient and  at the bedside, patient minimally participatory. We discussed goals of care in the setting of the patient's decline since CVA. We reviewed CODE STATUS options as well as potential placement versus home in the future. Patient's  unable to verbalize goals. When addressing CODE STATUS, he states he would like to have that discussion with the patient when she is \"lucid\". Reminded that the patient has not been able to have a quality conversation prior to her CVA, may be her baseline. We reviewed that unfortunately in some circumstances, the patient's next of kin does have to make these decisions and the patient is unable. The patient's  became tearful, he understands that this is a grim situation, however is not able to make these decisions at this time. We discussed quality of life over quantity. Unfortunately, the patient's  has a minimal support system.   He Coloration: Skin is pale. Neurological:      Mental Status: She is alert. Motor: Weakness present. Psychiatric:         Mood and Affect: Mood normal.         Thought Content: Thought content normal.         Judgment: Judgment normal.         Palliative Medicine Interventions:    patient/family support  Goals of Care discussions with patient/surrogate      DATA:  Current labs in the epic chart reviewed as of 8/1/2023   Review of previous notes, admits, labs, radiology and testing relevant to this consult done in this chart today 8/1/2023    Data Reviewed related to this consultation:    Review of prior note(s) from each unique source relevant to today's visit: Hospitalist, Case management, Neurology, Oncology. Unique test results reviewed: CBC and BMP      I have spent a total of 58 minutes on: Performing a medical appropriate examination and/or evaluation    Counseling and educating the patient/family/caregiver  Preparing to see the patient (e.g., review of tests)  Referring / communicating with other healthcare professionals including care coordination (not separately reported):  Hospitalist, Case management  Documenting clinical information in the electronic health record     Signed By: Electronically signed by MIKI Benitez CNP on 8/1/2023 at 11:05 AM   Palliative Medicine     August 1, 2023

## 2023-08-01 NOTE — PLAN OF CARE
Problem: Discharge Planning  Goal: Discharge to home or other facility with appropriate resources  Outcome: Progressing     Problem: Skin/Tissue Integrity  Goal: Absence of new skin breakdown  Description: 1. Monitor for areas of redness and/or skin breakdown  2. Assess vascular access sites hourly  3. Every 4-6 hours minimum:  Change oxygen saturation probe site  4. Every 4-6 hours:  If on nasal continuous positive airway pressure, respiratory therapy assess nares and determine need for appliance change or resting period.   Outcome: Progressing     Problem: Safety - Adult  Goal: Free from fall injury  Outcome: Progressing     Problem: Pain  Goal: Verbalizes/displays adequate comfort level or baseline comfort level  Outcome: Progressing     Problem: Neurosensory - Adult  Goal: Achieves stable or improved neurological status  Outcome: Progressing     Problem: Skin/Tissue Integrity - Adult  Goal: Skin integrity remains intact  Outcome: Progressing     Problem: Musculoskeletal - Adult  Goal: Return mobility to safest level of function  Outcome: Progressing     Problem: Genitourinary - Adult  Goal: Urinary catheter remains patent  Outcome: Progressing     Problem: Hematologic - Adult  Goal: Maintains hematologic stability  Outcome: Progressing     Problem: Gastrointestinal - Adult  Goal: Minimal or absence of nausea and vomiting  Outcome: Progressing

## 2023-08-01 NOTE — PROGRESS NOTES
Occupational Therapy  Therapy attempt. Pt with low H&H(7). Discussed with nursing. Will hold therapy this date and attempt tomorrow as able. Refer to the last note for status if pt is discharged.   Shamir METZ/SOPHIE,515

## 2023-08-01 NOTE — PROGRESS NOTES
V2.0    [facility] [place]      Name:  Charlie Moncada /Age/Sex: 1945  (65 y.o. female)   MRN & CSN:  [id number] & [id number] Encounter Date/Time: 2023 1:37 PM EDT   Location:  [place] [address]-52 PCP: Eulalia Vieira MD     Attending:Victoriano Bowie MD       Hospital Day: 8    Assessment and Recommendations   Charlie Moncada is a 65 y.o. female who presents with DENISE (acute kidney injury) [facility]    76-year-old female with significant previous history of recent stroke, hypertension hemolytic anemia presented to the hospital with change mental status anemia being transfused, GI consulted do not think is related to GI bleed, mental status slowly improving, restarting on aspirin and statin being treated also for infection  Plan:       Hemolytic anemia, Procrit ordered, discussed multiple times with hematology. UTI started antibiotics pending culture so far urine culture positive Pseudomonas and E. coli, patient was on cefepime even though patient presented with her encephalopathy,  changed cephalosporins especially cefepime to meropenem. We will keep on meropenem for now 1 more days  CKD nephrology consulted creatinine down to 1.4   Acute metabolic encephalopathy on top of chronic change in mental status, monitor closely, ordered CT head, ordered VBG, neurology consulted, mental status fluctuating. Improvement noted today patient up to chair continue to monitor  Acute kidney injury nephrology on board, creatinine decreased.   Today still at 1.4  Generalized weakness due to above PT OT patient not participating  History of stroke as above, family requesting follow-up after discharge with stroke center neurology consulted, as GI not thinking anemia its not related to bleeding, I will restart aspirin and statin at this time thrombocytopenia monitor for now, minimal improvement noted platelet count at 347  Hypernatremia D5, improving  Hypoglycemia, hypoglycemia protocol added on D5 currently  Increased

## 2023-08-02 LAB
ABO + RH BLD: NORMAL
ANION GAP SERPL CALCULATED.3IONS-SCNC: 5 MMOL/L (ref 3–16)
ANION GAP SERPL CALCULATED.3IONS-SCNC: 7 MMOL/L (ref 3–16)
ANION GAP SERPL CALCULATED.3IONS-SCNC: 7 MMOL/L (ref 3–16)
ANION GAP SERPL CALCULATED.3IONS-SCNC: 8 MMOL/L (ref 3–16)
BACTERIA URNS QL MICRO: ABNORMAL /HPF
BASOPHILS # BLD: 0 K/UL (ref 0–0.2)
BASOPHILS NFR BLD: 0.7 %
BILIRUB UR QL STRIP.AUTO: NEGATIVE
BLD GP AB SCN SERPL QL: NORMAL
BLOOD BANK DISPENSE STATUS: NORMAL
BLOOD BANK DISPENSE STATUS: NORMAL
BLOOD BANK PRODUCT CODE: NORMAL
BLOOD BANK PRODUCT CODE: NORMAL
BPU ID: NORMAL
BPU ID: NORMAL
BUN SERPL-MCNC: 24 MG/DL (ref 7–20)
BUN SERPL-MCNC: 26 MG/DL (ref 7–20)
BUN SERPL-MCNC: 27 MG/DL (ref 7–20)
BUN SERPL-MCNC: 27 MG/DL (ref 7–20)
CALCIUM SERPL-MCNC: 7.3 MG/DL (ref 8.3–10.6)
CALCIUM SERPL-MCNC: 7.9 MG/DL (ref 8.3–10.6)
CALCIUM SERPL-MCNC: 8.2 MG/DL (ref 8.3–10.6)
CALCIUM SERPL-MCNC: 8.2 MG/DL (ref 8.3–10.6)
CHARACTER UR: ABNORMAL
CHLORIDE SERPL-SCNC: 104 MMOL/L (ref 99–110)
CHLORIDE SERPL-SCNC: 105 MMOL/L (ref 99–110)
CHLORIDE SERPL-SCNC: 106 MMOL/L (ref 99–110)
CHLORIDE SERPL-SCNC: 96 MMOL/L (ref 99–110)
CLARITY UR: CLEAR
CO2 SERPL-SCNC: 26 MMOL/L (ref 21–32)
CO2 SERPL-SCNC: 27 MMOL/L (ref 21–32)
CO2 SERPL-SCNC: 27 MMOL/L (ref 21–32)
CO2 SERPL-SCNC: 28 MMOL/L (ref 21–32)
COLOR UR: YELLOW
CREAT SERPL-MCNC: 1.2 MG/DL (ref 0.6–1.2)
CREAT SERPL-MCNC: 1.3 MG/DL (ref 0.6–1.2)
DEPRECATED RDW RBC AUTO: 17.6 % (ref 12.4–15.4)
DESCRIPTION BLOOD BANK: NORMAL
DESCRIPTION BLOOD BANK: NORMAL
EKG ATRIAL RATE: 101 BPM
EKG ATRIAL RATE: 95 BPM
EKG DIAGNOSIS: NORMAL
EKG DIAGNOSIS: NORMAL
EKG P AXIS: 68 DEGREES
EKG P AXIS: 74 DEGREES
EKG P-R INTERVAL: 150 MS
EKG P-R INTERVAL: 152 MS
EKG Q-T INTERVAL: 336 MS
EKG Q-T INTERVAL: 340 MS
EKG QRS DURATION: 96 MS
EKG QRS DURATION: 98 MS
EKG QTC CALCULATION (BAZETT): 427 MS
EKG QTC CALCULATION (BAZETT): 435 MS
EKG R AXIS: -27 DEGREES
EKG R AXIS: -33 DEGREES
EKG T AXIS: 54 DEGREES
EKG T AXIS: 61 DEGREES
EKG VENTRICULAR RATE: 101 BPM
EKG VENTRICULAR RATE: 95 BPM
EOSINOPHIL # BLD: 0.3 K/UL (ref 0–0.6)
EOSINOPHIL NFR BLD: 9 %
EPI CELLS #/AREA URNS HPF: ABNORMAL /HPF (ref 0–5)
GFR SERPLBLD CREATININE-BSD FMLA CKD-EPI: 42 ML/MIN/{1.73_M2}
GFR SERPLBLD CREATININE-BSD FMLA CKD-EPI: 46 ML/MIN/{1.73_M2}
GLUCOSE BLD-MCNC: 103 MG/DL (ref 70–99)
GLUCOSE BLD-MCNC: 112 MG/DL (ref 70–99)
GLUCOSE BLD-MCNC: 128 MG/DL (ref 70–99)
GLUCOSE BLD-MCNC: 81 MG/DL (ref 70–99)
GLUCOSE BLD-MCNC: 83 MG/DL (ref 70–99)
GLUCOSE BLD-MCNC: 85 MG/DL (ref 70–99)
GLUCOSE BLD-MCNC: 86 MG/DL (ref 70–99)
GLUCOSE BLD-MCNC: 96 MG/DL (ref 70–99)
GLUCOSE SERPL-MCNC: 101 MG/DL (ref 70–99)
GLUCOSE SERPL-MCNC: 159 MG/DL (ref 70–99)
GLUCOSE SERPL-MCNC: 496 MG/DL (ref 70–99)
GLUCOSE SERPL-MCNC: 96 MG/DL (ref 70–99)
GLUCOSE UR STRIP.AUTO-MCNC: NEGATIVE MG/DL
HCT VFR BLD AUTO: 20 % (ref 36–48)
HCT VFR BLD AUTO: 25.8 % (ref 36–48)
HGB BLD-MCNC: 6.7 G/DL (ref 12–16)
HGB BLD-MCNC: 8.7 G/DL (ref 12–16)
HGB UR QL STRIP.AUTO: ABNORMAL
KETONES UR STRIP.AUTO-MCNC: NEGATIVE MG/DL
LEUKOCYTE ESTERASE UR QL STRIP.AUTO: ABNORMAL
LYMPHOCYTES # BLD: 0.6 K/UL (ref 1–5.1)
LYMPHOCYTES NFR BLD: 18.3 %
MCH RBC QN AUTO: 32.4 PG (ref 26–34)
MCHC RBC AUTO-ENTMCNC: 33.7 G/DL (ref 31–36)
MCV RBC AUTO: 96 FL (ref 80–100)
MONOCYTES # BLD: 0.3 K/UL (ref 0–1.3)
MONOCYTES NFR BLD: 8.3 %
MUCOUS THREADS #/AREA URNS LPF: ABNORMAL /LPF
NEUTROPHILS # BLD: 2 K/UL (ref 1.7–7.7)
NEUTROPHILS NFR BLD: 63.7 %
NITRITE UR QL STRIP.AUTO: NEGATIVE
PERFORMED ON: ABNORMAL
PERFORMED ON: NORMAL
PH UR STRIP.AUTO: 5 [PH] (ref 5–8)
PLATELET # BLD AUTO: 120 K/UL (ref 135–450)
PMV BLD AUTO: 7.6 FL (ref 5–10.5)
POTASSIUM SERPL-SCNC: 3.9 MMOL/L (ref 3.5–5.1)
POTASSIUM SERPL-SCNC: 4.2 MMOL/L (ref 3.5–5.1)
POTASSIUM SERPL-SCNC: 4.4 MMOL/L (ref 3.5–5.1)
POTASSIUM SERPL-SCNC: 5.6 MMOL/L (ref 3.5–5.1)
PROT UR STRIP.AUTO-MCNC: 30 MG/DL
RBC # BLD AUTO: 2.09 M/UL (ref 4–5.2)
RBC #/AREA URNS HPF: ABNORMAL /HPF (ref 0–4)
SODIUM SERPL-SCNC: 128 MMOL/L (ref 136–145)
SODIUM SERPL-SCNC: 139 MMOL/L (ref 136–145)
SODIUM SERPL-SCNC: 139 MMOL/L (ref 136–145)
SODIUM SERPL-SCNC: 140 MMOL/L (ref 136–145)
SP GR UR STRIP.AUTO: <=1.005 (ref 1–1.03)
UA DIPSTICK W REFLEX MICRO PNL UR: YES
URN SPEC COLLECT METH UR: ABNORMAL
UROBILINOGEN UR STRIP-ACNC: 0.2 E.U./DL
WBC # BLD AUTO: 3.1 K/UL (ref 4–11)
WBC #/AREA URNS HPF: ABNORMAL /HPF (ref 0–5)
YEAST URNS QL MICRO: PRESENT /HPF

## 2023-08-02 PROCEDURE — 85025 COMPLETE CBC W/AUTO DIFF WBC: CPT

## 2023-08-02 PROCEDURE — 93010 ELECTROCARDIOGRAM REPORT: CPT | Performed by: INTERNAL MEDICINE

## 2023-08-02 PROCEDURE — 6370000000 HC RX 637 (ALT 250 FOR IP): Performed by: INTERNAL MEDICINE

## 2023-08-02 PROCEDURE — 86900 BLOOD TYPING SEROLOGIC ABO: CPT

## 2023-08-02 PROCEDURE — 80048 BASIC METABOLIC PNL TOTAL CA: CPT

## 2023-08-02 PROCEDURE — 6360000002 HC RX W HCPCS: Performed by: INTERNAL MEDICINE

## 2023-08-02 PROCEDURE — 85018 HEMOGLOBIN: CPT

## 2023-08-02 PROCEDURE — 83036 HEMOGLOBIN GLYCOSYLATED A1C: CPT

## 2023-08-02 PROCEDURE — 81001 URINALYSIS AUTO W/SCOPE: CPT

## 2023-08-02 PROCEDURE — 2580000003 HC RX 258

## 2023-08-02 PROCEDURE — 2580000003 HC RX 258: Performed by: INTERNAL MEDICINE

## 2023-08-02 PROCEDURE — 86901 BLOOD TYPING SEROLOGIC RH(D): CPT

## 2023-08-02 PROCEDURE — 85014 HEMATOCRIT: CPT

## 2023-08-02 PROCEDURE — P9016 RBC LEUKOCYTES REDUCED: HCPCS

## 2023-08-02 PROCEDURE — 86923 COMPATIBILITY TEST ELECTRIC: CPT

## 2023-08-02 PROCEDURE — 36415 COLL VENOUS BLD VENIPUNCTURE: CPT

## 2023-08-02 PROCEDURE — 36430 TRANSFUSION BLD/BLD COMPNT: CPT

## 2023-08-02 PROCEDURE — 94760 N-INVAS EAR/PLS OXIMETRY 1: CPT

## 2023-08-02 PROCEDURE — 86850 RBC ANTIBODY SCREEN: CPT

## 2023-08-02 PROCEDURE — 93005 ELECTROCARDIOGRAM TRACING: CPT | Performed by: INTERNAL MEDICINE

## 2023-08-02 PROCEDURE — 2060000000 HC ICU INTERMEDIATE R&B

## 2023-08-02 RX ORDER — DEXTROSE MONOHYDRATE 50 MG/ML
INJECTION, SOLUTION INTRAVENOUS
Status: COMPLETED
Start: 2023-08-02 | End: 2023-08-02

## 2023-08-02 RX ORDER — SODIUM CHLORIDE 9 MG/ML
INJECTION, SOLUTION INTRAVENOUS PRN
Status: DISCONTINUED | OUTPATIENT
Start: 2023-08-02 | End: 2023-08-05 | Stop reason: HOSPADM

## 2023-08-02 RX ORDER — DEXTROSE MONOHYDRATE 50 MG/ML
INJECTION, SOLUTION INTRAVENOUS CONTINUOUS
Status: DISCONTINUED | OUTPATIENT
Start: 2023-08-02 | End: 2023-08-04

## 2023-08-02 RX ORDER — FUROSEMIDE 10 MG/ML
40 INJECTION INTRAMUSCULAR; INTRAVENOUS ONCE
Status: COMPLETED | OUTPATIENT
Start: 2023-08-02 | End: 2023-08-02

## 2023-08-02 RX ADMIN — MEROPENEM 1000 MG: 1 INJECTION, POWDER, FOR SOLUTION INTRAVENOUS at 13:56

## 2023-08-02 RX ADMIN — ATORVASTATIN CALCIUM 20 MG: 20 TABLET, FILM COATED ORAL at 21:46

## 2023-08-02 RX ADMIN — HEPARIN SODIUM 5000 UNITS: 5000 INJECTION INTRAVENOUS; SUBCUTANEOUS at 05:11

## 2023-08-02 RX ADMIN — DEXTROSE MONOHYDRATE: 50 INJECTION, SOLUTION INTRAVENOUS at 05:10

## 2023-08-02 RX ADMIN — ASPIRIN 81 MG: 81 TABLET, COATED ORAL at 10:16

## 2023-08-02 RX ADMIN — DEXTROSE MONOHYDRATE: 50 INJECTION, SOLUTION INTRAVENOUS at 17:09

## 2023-08-02 RX ADMIN — FOLIC ACID 1000 MCG: 1 TABLET ORAL at 10:16

## 2023-08-02 RX ADMIN — EPOETIN ALFA-EPBX 40000 UNITS: 40000 INJECTION, SOLUTION INTRAVENOUS; SUBCUTANEOUS at 10:16

## 2023-08-02 RX ADMIN — FENOFIBRATE 54 MG: 54 TABLET, FILM COATED ORAL at 10:16

## 2023-08-02 RX ADMIN — LEVOTHYROXINE SODIUM 150 MCG: 0.07 TABLET ORAL at 05:11

## 2023-08-02 RX ADMIN — MEROPENEM 1000 MG: 1 INJECTION, POWDER, FOR SOLUTION INTRAVENOUS at 02:18

## 2023-08-02 RX ADMIN — SODIUM CHLORIDE, PRESERVATIVE FREE 10 ML: 5 INJECTION INTRAVENOUS at 21:51

## 2023-08-02 RX ADMIN — FUROSEMIDE 40 MG: 10 INJECTION, SOLUTION INTRAMUSCULAR; INTRAVENOUS at 11:42

## 2023-08-02 RX ADMIN — ESTRADIOL 2 MG: 1 TABLET ORAL at 10:15

## 2023-08-02 NOTE — PROGRESS NOTES
V2.0  [facility] [place] Hospital[place]      Name:  Ron Forbes /Age/Sex: 1945  (65 y.o. female)   MRN & CSN:  [id number] & [id number] Encounter Date/Time: 2023 4:40 PM EDT    Location:  [place] [address]-30 PCP: Jose Guadalupe Glasgow MD       Hospital Day: 9    Assessment and Plan:   Ron Forbes is a 65 y.o. female with pmh of recent stroke, hemolytic anemia who presents with altered mental status/generalized weakness and was found to have DENISE (acute kidney injury) ([address]) in addition to dropping hemoglobin      Plan:  Hemolytic anemia  DENISE  UTI-present on admission  Chronic kidney disease with baseline creatinine about 1.4  Acute metabolic encephalopathy-multifactorial  History of recent stroke  Elevated troponin-likely demand ischemia  Essential hypertension  Hypernatremia  Hyperglycemia        This is a sick appearing female. Hemoglobin dropped further. Partly could be dilutional.  Has history of GI bleed as well. We will transfuse her blood and monitor hemoglobin closely. Continue with other medications including PPI  DENISE on chronic kidney disease stage III. Creatinine down to baseline  Hyponatremia-hemodynamic. For some reason sodium went down significantly which was likely lab error. Repeat sodium is fine. Nephrology following and help appreciated  Hypoglycemia than hyperglycemia: Blood sugar went up on the higher side after being on dextrose. Check A1c  Hypothyroidism. Continue with levothyroxine  PT/OT when able  Palliative care on board as prognosis is guarded    Diet ADULT ORAL NUTRITION SUPPLEMENT; Breakfast, Lunch, Dinner; Standard High Calorie/High Protein Oral Supplement  ADULT DIET; Regular; 5 carb choices (75 gm/meal); No Added Salt (3-4 gm)   DVT Prophylaxis [] Lovenox, []  Heparin, [x] SCDs, [] Ambulation,  [] Eliquis, [] Xarelto  [] Coumadin   Code Status DNR-CCA   Disposition From: Skilled facility  Expected Disposition:  To be determined  Estimated Date of Discharge: 2 to 3 significant change was found        Imaging/Diagnostics Last 24 Hours   No results found. Electronically signed by Nella Avilez MD on 8/2/2023 at 4:40 PM      Note: This report has been produced using speech recognition software and may contain errors related to that system including errors in grammar, punctuation, and spelling, as well as words and phrases that may be inappropriate. If there are any questions or concerns please feel free to contact the dictating provider for clarification.

## 2023-08-02 NOTE — PROGRESS NOTES
Occupational Therapy  Pt continues with critically low H&H (6.7) and will hold therapy today. Palliative care following and met with family regarding hospice services. Will follow and attempt as appropriate. Refer to the last note for status if pt is discharged.   Shamir METZ/SOPHIE,515

## 2023-08-02 NOTE — CARE COORDINATION
SW attempted to speak with pt's spouse Taylor Faith, about return to [facility]. Spouse informed that he doesn't believe so. SW asked if he spoke with Priscilla Coffman on a plan, he stated no. SW will reach out to Priscilla Coffman and see if he is agreeable to inpt hospice or hospice at home.      Magdalene Hernandez LMSW, [address] [facility] Social Work Case Management   Phone: [phone]  Fax: [phone]

## 2023-08-02 NOTE — PLAN OF CARE
Problem: Discharge Planning  Goal: Discharge to home or other facility with appropriate resources  Outcome: Progressing     Problem: Skin/Tissue Integrity  Goal: Absence of new skin breakdown  Description: 1. Monitor for areas of redness and/or skin breakdown  2. Assess vascular access sites hourly  3. Every 4-6 hours minimum:  Change oxygen saturation probe site  4. Every 4-6 hours:  If on nasal continuous positive airway pressure, respiratory therapy assess nares and determine need for appliance change or resting period.   Outcome: Progressing     Problem: Safety - Adult  Goal: Free from fall injury  Outcome: Progressing     Problem: Pain  Goal: Verbalizes/displays adequate comfort level or baseline comfort level  Outcome: Progressing     Problem: Respiratory - Adult  Goal: Achieves optimal ventilation and oxygenation  Outcome: Progressing     Problem: Cardiovascular - Adult  Goal: Maintains optimal cardiac output and hemodynamic stability  Outcome: Progressing     Problem: Genitourinary - Adult  Goal: Absence of urinary retention  Outcome: Progressing     Problem: Genitourinary - Adult  Goal: Urinary catheter remains patent  Outcome: Progressing     Problem: Hematologic - Adult  Goal: Maintains hematologic stability  Outcome: Progressing     Problem: Metabolic/Fluid and Electrolytes - Adult  Goal: Hemodynamic stability and optimal renal function maintained  Outcome: Progressing

## 2023-08-02 NOTE — CARE COORDINATION
ADRIAN met with Andra Lauren from Palliative and informed that she met with spouse a couple of times, and there is hesitancy on making a decision on what to do. She recommended [facility] or [facility] which are inpatient hospices. SW to follow for dc planning needs. ADRIAN left VM for spouse informing that contact info was left at bedside for Mandie.      Janay Oliveira LMSW, [address] [facility] Social Work Case Management   Phone: [phone]  Fax: [phone]

## 2023-08-02 NOTE — PLAN OF CARE
Problem: Discharge Planning  Goal: Discharge to home or other facility with appropriate resources  8/2/2023 1529 by Trina Ashton RN  Outcome: Progressing  8/2/2023 0349 by Beatriz Armas RN  Outcome: Progressing     Problem: Skin/Tissue Integrity  Goal: Absence of new skin breakdown  Description: 1. Monitor for areas of redness and/or skin breakdown  2. Assess vascular access sites hourly  3. Every 4-6 hours minimum:  Change oxygen saturation probe site  4. Every 4-6 hours:  If on nasal continuous positive airway pressure, respiratory therapy assess nares and determine need for appliance change or resting period.   8/2/2023 1529 by Trina Ashton RN  Outcome: Progressing  8/2/2023 0349 by Beatriz Armas RN  Outcome: Progressing     Problem: Safety - Adult  Goal: Free from fall injury  8/2/2023 1529 by Trina Ashton RN  Outcome: Progressing  8/2/2023 0349 by Beatriz Armas RN  Outcome: Progressing     Problem: Pain  Goal: Verbalizes/displays adequate comfort level or baseline comfort level  8/2/2023 1529 by Trina Ashton RN  Outcome: Progressing  8/2/2023 0349 by Beatriz Armas RN  Outcome: Progressing     Problem: Neurosensory - Adult  Goal: Achieves stable or improved neurological status  8/2/2023 1529 by Trina Ashton RN  Outcome: Progressing  8/2/2023 0349 by Beatriz Armas RN  Outcome: Progressing     Problem: Respiratory - Adult  Goal: Achieves optimal ventilation and oxygenation  8/2/2023 1529 by Trina Ashton RN  Outcome: Progressing  8/2/2023 0349 by Beatriz Armas RN  Outcome: Progressing     Problem: Cardiovascular - Adult  Goal: Maintains optimal cardiac output and hemodynamic stability  8/2/2023 1529 by Trina Ashton RN  Outcome: Progressing  8/2/2023 0349 by Beatriz Armas RN  Outcome: Progressing     Problem: Skin/Tissue Integrity - Adult  Goal: Skin integrity remains intact  8/2/2023 1529 by Trina Ashton RN  Outcome: Progressing  8/2/2023 0349 by Beatriz Armas

## 2023-08-02 NOTE — PROGRESS NOTES
Comprehensive Nutrition Assessment    Type and Reason for Visit:  Initial, RD Nutrition Re-Screen/LOS    Nutrition Recommendations/Plan:   Will liberalize diet to allow for hopeful improved po intake  Will add Ensure Enlive tid to start  Will attempt NFPE another time     Malnutrition Assessment:  Malnutrition Status:  Insufficient data (08/02/23 1533)    Context:  Acute Illness     Findings of the 6 clinical characteristics of malnutrition:  Energy Intake:  50% or less of estimated energy requirements for 5 or more days  Weight Loss:  No significant weight loss     Body Fat Loss:  Unable to assess     Muscle Mass Loss:  Unable to assess    Fluid Accumulation:  Moderate to Severe Extremities   Strength:  Not Performed    Nutrition Assessment:    Assessment for LOS. Unable to speak with pt or family, so information taken from EMR. PMH includes CKD, HLD, recent CVA. Pt adm with altered mental status. Found to have UTI and DENISE. Diet adv to [address] (3) / 2 gm Na. Intake noted as poor at 1-25%. MD discussed tube feeding with pt . Pt does not want to be tube fed. Noted Palliative Care on board with possble consult to hospice care. Will liberalize diet, for hopeful increased po and add ONS tid. Will continue to monitor status. Nutrition Related Findings:    Labs reviewed. Noted BM on 7/31. Noted pt is +1.9 liters. Noted non-pitting generalized edema, +1 pitting edema to LUE and +3 pitting edema to BLE. Wound Type: None       Current Nutrition Intake & Therapies:    Average Meal Intake: 1-25%     ADULT DIET; Regular; 3 carb choices (45 gm/meal); Low Sodium (2 gm)    Anthropometric Measures:  Height: 5' 3\" (160 cm)  Ideal Body Weight (IBW): 115 lbs (52 kg)    Admission Body Weight: 271 lb (122.9 kg)  Current Body Weight: 271 lb (122.9 kg),   IBW.  Weight Source: Bed Scale  Current BMI (kg/m2): 48  Usual Body Weight:  (270s per record over the past 2 years)                       BMI Categories: Obese Class 3 (BMI 40.0 or

## 2023-08-02 NOTE — PROGRESS NOTES
Notified Nephrology via [facility] that patient sodium is 128. Received orders to discontinue continuous D5.     Electronically signed by Hilary Talamantes RN on 8/2/2023 at 8:04 AM

## 2023-08-03 LAB
ALBUMIN SERPL-MCNC: 2.8 G/DL (ref 3.4–5)
ANION GAP SERPL CALCULATED.3IONS-SCNC: 6 MMOL/L (ref 3–16)
BUN SERPL-MCNC: 24 MG/DL (ref 7–20)
CALCIUM SERPL-MCNC: 8.5 MG/DL (ref 8.3–10.6)
CHLORIDE SERPL-SCNC: 107 MMOL/L (ref 99–110)
CO2 SERPL-SCNC: 29 MMOL/L (ref 21–32)
CORTIS AM PEAK SERPL-MCNC: 14.2 UG/DL (ref 4.3–22.4)
CREAT SERPL-MCNC: 1.3 MG/DL (ref 0.6–1.2)
DEPRECATED RDW RBC AUTO: 18.1 % (ref 12.4–15.4)
EST. AVERAGE GLUCOSE BLD GHB EST-MCNC: 82.5 MG/DL
GFR SERPLBLD CREATININE-BSD FMLA CKD-EPI: 42 ML/MIN/{1.73_M2}
GLUCOSE BLD-MCNC: 108 MG/DL (ref 70–99)
GLUCOSE BLD-MCNC: 111 MG/DL (ref 70–99)
GLUCOSE BLD-MCNC: 122 MG/DL (ref 70–99)
GLUCOSE BLD-MCNC: 133 MG/DL (ref 70–99)
GLUCOSE SERPL-MCNC: 101 MG/DL (ref 70–99)
HBA1C MFR BLD: 4.5 %
HCT VFR BLD AUTO: 26 % (ref 36–48)
HGB BLD-MCNC: 9.1 G/DL (ref 12–16)
MCH RBC QN AUTO: 32.2 PG (ref 26–34)
MCHC RBC AUTO-ENTMCNC: 35 G/DL (ref 31–36)
MCV RBC AUTO: 91.9 FL (ref 80–100)
PERFORMED ON: ABNORMAL
PHOSPHATE SERPL-MCNC: 2.4 MG/DL (ref 2.5–4.9)
PLATELET # BLD AUTO: 141 K/UL (ref 135–450)
PMV BLD AUTO: 7.4 FL (ref 5–10.5)
POTASSIUM SERPL-SCNC: 4.2 MMOL/L (ref 3.5–5.1)
RBC # BLD AUTO: 2.83 M/UL (ref 4–5.2)
SODIUM SERPL-SCNC: 142 MMOL/L (ref 136–145)
URATE SERPL-MCNC: 7.1 MG/DL (ref 2.6–6)
WBC # BLD AUTO: 3.5 K/UL (ref 4–11)

## 2023-08-03 PROCEDURE — 84550 ASSAY OF BLOOD/URIC ACID: CPT

## 2023-08-03 PROCEDURE — 2060000000 HC ICU INTERMEDIATE R&B

## 2023-08-03 PROCEDURE — 2580000003 HC RX 258: Performed by: INTERNAL MEDICINE

## 2023-08-03 PROCEDURE — 97530 THERAPEUTIC ACTIVITIES: CPT

## 2023-08-03 PROCEDURE — 6360000002 HC RX W HCPCS: Performed by: INTERNAL MEDICINE

## 2023-08-03 PROCEDURE — 94760 N-INVAS EAR/PLS OXIMETRY 1: CPT

## 2023-08-03 PROCEDURE — 80069 RENAL FUNCTION PANEL: CPT

## 2023-08-03 PROCEDURE — 82533 TOTAL CORTISOL: CPT

## 2023-08-03 PROCEDURE — 6370000000 HC RX 637 (ALT 250 FOR IP): Performed by: INTERNAL MEDICINE

## 2023-08-03 PROCEDURE — 85027 COMPLETE CBC AUTOMATED: CPT

## 2023-08-03 PROCEDURE — 97110 THERAPEUTIC EXERCISES: CPT

## 2023-08-03 RX ORDER — HYDRALAZINE HYDROCHLORIDE 50 MG/1
50 TABLET, FILM COATED ORAL 3 TIMES DAILY
Status: DISCONTINUED | OUTPATIENT
Start: 2023-08-03 | End: 2023-08-05 | Stop reason: HOSPADM

## 2023-08-03 RX ORDER — FUROSEMIDE 20 MG/1
20 TABLET ORAL 2 TIMES DAILY
Status: DISCONTINUED | OUTPATIENT
Start: 2023-08-03 | End: 2023-08-05 | Stop reason: HOSPADM

## 2023-08-03 RX ORDER — LOSARTAN POTASSIUM 100 MG/1
100 TABLET ORAL DAILY
Status: DISCONTINUED | OUTPATIENT
Start: 2023-08-03 | End: 2023-08-05 | Stop reason: HOSPADM

## 2023-08-03 RX ADMIN — HYDRALAZINE HYDROCHLORIDE 50 MG: 50 TABLET, FILM COATED ORAL at 13:55

## 2023-08-03 RX ADMIN — DEXTROSE MONOHYDRATE: 50 INJECTION, SOLUTION INTRAVENOUS at 13:49

## 2023-08-03 RX ADMIN — FUROSEMIDE 20 MG: 20 TABLET ORAL at 20:43

## 2023-08-03 RX ADMIN — ATORVASTATIN CALCIUM 20 MG: 20 TABLET, FILM COATED ORAL at 20:43

## 2023-08-03 RX ADMIN — ASPIRIN 81 MG: 81 TABLET, COATED ORAL at 08:54

## 2023-08-03 RX ADMIN — MEROPENEM 1000 MG: 1 INJECTION, POWDER, FOR SOLUTION INTRAVENOUS at 14:04

## 2023-08-03 RX ADMIN — LEVOTHYROXINE SODIUM 150 MCG: 0.07 TABLET ORAL at 06:19

## 2023-08-03 RX ADMIN — DILTIAZEM HYDROCHLORIDE 30 MG: 30 TABLET, FILM COATED ORAL at 23:03

## 2023-08-03 RX ADMIN — SODIUM CHLORIDE, PRESERVATIVE FREE 10 ML: 5 INJECTION INTRAVENOUS at 20:49

## 2023-08-03 RX ADMIN — FUROSEMIDE 20 MG: 20 TABLET ORAL at 08:54

## 2023-08-03 RX ADMIN — HEPARIN SODIUM 5000 UNITS: 5000 INJECTION INTRAVENOUS; SUBCUTANEOUS at 13:55

## 2023-08-03 RX ADMIN — HEPARIN SODIUM 5000 UNITS: 5000 INJECTION INTRAVENOUS; SUBCUTANEOUS at 20:43

## 2023-08-03 RX ADMIN — HYDRALAZINE HYDROCHLORIDE 50 MG: 50 TABLET, FILM COATED ORAL at 08:54

## 2023-08-03 RX ADMIN — HEPARIN SODIUM 5000 UNITS: 5000 INJECTION INTRAVENOUS; SUBCUTANEOUS at 06:19

## 2023-08-03 RX ADMIN — FOLIC ACID 1000 MCG: 1 TABLET ORAL at 08:54

## 2023-08-03 RX ADMIN — MEROPENEM 1000 MG: 1 INJECTION, POWDER, FOR SOLUTION INTRAVENOUS at 02:31

## 2023-08-03 RX ADMIN — ESTRADIOL 2 MG: 1 TABLET ORAL at 08:54

## 2023-08-03 RX ADMIN — FENOFIBRATE 54 MG: 54 TABLET, FILM COATED ORAL at 08:54

## 2023-08-03 RX ADMIN — HYDRALAZINE HYDROCHLORIDE 50 MG: 50 TABLET, FILM COATED ORAL at 20:43

## 2023-08-03 RX ADMIN — LOSARTAN POTASSIUM 100 MG: 100 TABLET, FILM COATED ORAL at 08:54

## 2023-08-03 ASSESSMENT — ENCOUNTER SYMPTOMS: SHORTNESS OF BREATH: 0

## 2023-08-03 NOTE — PROGRESS NOTES
V2.0  [facility] [place] Hospitalist Progress Note      Name:  Wallace Love /Age/Sex: 1945  (65 y.o. female)   MRN & CSN:  [id number] & [id number] Encounter Date/Time: 8/3/2023 4:40 PM EDT    Location:  [id number] PCP: Gaby Parra MD       Hospital Day: 10    Assessment and Plan:   Wallace Love is a 65 y.o. female with pmh of recent stroke, hemolytic anemia who presents with altered mental status/generalized weakness and was found to have DENISE (acute kidney injury) ([address]) in addition to dropping hemoglobin      Plan:  Hemolytic anemia. Has been a chronic issue. Status post blood transfusion. Hemoglobin has come up nicely. DENISE. Improved. UTI-present on admission. On antibiotic and she will finish the course soon  Chronic kidney disease with baseline creatinine about 1.4 nephrology following and help appreciated  Acute metabolic encephalopathy-multifactorial including anemia/UTI/DENISE. She appears to be back to her baseline  History of recent stroke she stayed in bed most of the time. Palliative care on board. Patient/family deciding about hospice issues  Elevated troponin-likely demand ischemia. Medical management  Essential hypertension. Blood pressure is fair although heart rate has been on the high side. Resumed most of her home medications, however, changed her Norvasc to Cardizem as that would also control her heart rate  Hypernatremia. Improved            Diet ADULT ORAL NUTRITION SUPPLEMENT; Breakfast, Lunch, Dinner; Standard High Calorie/High Protein Oral Supplement  ADULT DIET; Regular; 5 carb choices (75 gm/meal); No Added Salt (3-4 gm)   DVT Prophylaxis [] Lovenox, []  Heparin, [x] SCDs, [] Ambulation,  [] Eliquis, [] Xarelto  [] Coumadin   Code Status DNR-CCA   Disposition From: Skilled facility  Expected Disposition:  To be determined  Estimated Date of Discharge: 2 to 3 days  Patient requires continued admission due to anemia/electrolytes abnormalities   Surrogate Decision

## 2023-08-03 NOTE — PROGRESS NOTES
Occupational Therapy  Facility/Department: [address] PROGRESSIVE CARE  Occupational Therapy Daily Note  Name: Mahogany Mas  : 1945  MRN: [id number]  Date of Service: 8/3/2023    Discharge Recommendations:  3-5 sessions per week, Patient would benefit from continued therapy after discharge   Mahogany Mas scored a 8/24 on the AM-PAC ADL Inpatient form. Current research shows that an AM-PAC score of 17 or less is typically not associated with a discharge to the patient's home setting. Based on the patient's AM-PAC score and their current ADL deficits, it is recommended that the patient have 3-5 sessions per week of Occupational Therapy at d/c to increase the patient's independence. Please see assessment section for further patient specific details. If patient discharges prior to next session this note will serve as a discharge summary. Please see below for the latest assessment towards goals. Patient Diagnosis(es): The primary encounter diagnosis was Hypotension, unspecified hypotension type. Diagnoses of Anemia, unspecified type, Acute kidney injury superimposed on chronic kidney disease ([address]), Thrombocytopenia ([address]), Elevated troponin, Hyperkalemia, Goals of care, counseling/discussion, and Urinary tract infection associated with indwelling urethral catheter, initial encounter [facility]) were also pertinent to this visit. Past Medical History:  has a past medical history of Anemia, Arthritis, Chronic kidney disease, H/O bladder problems, Hyperlipidemia, Hypertension, and Thyroid disease. Past Surgical History:  has a past surgical history that includes knee surgery; Hysterectomy; Dilation and curettage of uterus; Appendectomy; Cholecystectomy; IR BIOPSY BONE MARROW (2022); and CT BIOPSY BONE MARROW (2022). Assessment   Performance deficits / Impairments: Decreased functional mobility ; Decreased ADL status; Decreased ROM; Decreased strength;Decreased cognition;Decreased required to implement solutions;Assistance required to generate solutions;Assistance required to correct errors made  Insights: Not aware of deficits  Initiation: Requires cues for all  Sequencing: Requires cues for all  Cognition Comment: hx of CVA with expressive aphasia. Able to attempt to follow basic instructions, ex's (returned demonstration of elbow and hand ex's). Orientation  Overall Orientation Status: Impaired  Orientation Level: Oriented to person          PROM Exercises: Completed to BUE, x10 reps. Pt able to follow demonstration ex for elbow flex and hand motions (gross grasp/release). AAROM/PROM for shoulder motions. Education Given To: Patient  Education Provided: Role of Therapy;Plan of Care;Home Exercise Program;Transfer Training  Education Method: Demonstration;Verbal  Barriers to Learning: Cognition  Education Outcome: Continued education needed     AM-PAC Score        AM-PAC Inpatient Daily Activity Raw Score: 8 (08/03/23 1027)  AM-PAC Inpatient ADL T-Scale Score : 22.86 (08/03/23 1027)  ADL Inpatient CMS 0-100% Score: 85.69 (08/03/23 1027)  ADL Inpatient CMS G-Code Modifier : CM (08/03/23 1027)    Goals  Short Term Goals  Time Frame for Short Term Goals: Prior to d/c. Status: goal ongoing 8-3-23  Short Term Goal 1: Pt will tolerate sitting EOB >3 minutes for functional task with mod A to improve sitting balance for ADL routine. Short Term Goal 2: Pt will tolerate 10-15 minutes of B UE therex/neuromuscular re-ed to improve strength for ADLs. Short Term Goal 3: Pt will complete feeding with mod A. Short Term Goal 4: Pt complete grooming with mod A. Short Term Goal 5: Pt will complete bed mobility with max A x2 in prep for ADL transfer. Short Term Goal 6: Assess ADL transfer as able with use of lift equipment. Long Term Goals  Time Frame for Long Term Goals : STGs=LTGs  Patient Goals   Patient goals : pt unable to verbalize personal therapy goal d/t decreased cognition and aphasia.

## 2023-08-03 NOTE — PLAN OF CARE
Problem: Discharge Planning  Goal: Discharge to home or other facility with appropriate resources  8/3/2023 0414 by Lashell Nguyen RN  Outcome: Progressing     Problem: Skin/Tissue Integrity  Goal: Absence of new skin breakdown  Description: 1. Monitor for areas of redness and/or skin breakdown  2. Assess vascular access sites hourly  3. Every 4-6 hours minimum:  Change oxygen saturation probe site  4. Every 4-6 hours:  If on nasal continuous positive airway pressure, respiratory therapy assess nares and determine need for appliance change or resting period.   8/3/2023 0414 by Lashell Nguyen RN  Outcome: Progressing     Problem: Safety - Adult  Goal: Free from fall injury  8/3/2023 0414 by Lashell Nguyen RN  Outcome: Progressing     Problem: Pain  Goal: Verbalizes/displays adequate comfort level or baseline comfort level  8/3/2023 0414 by Lashell Nguyen RN  Outcome: Progressing     Problem: Neurosensory - Adult  Goal: Achieves stable or improved neurological status  8/3/2023 0414 by Lashell Nguyen RN  Outcome: Progressing     Problem: Respiratory - Adult  Goal: Achieves optimal ventilation and oxygenation  8/3/2023 0414 by Lashell Nguyen RN  Outcome: Progressing     Problem: Cardiovascular - Adult  Goal: Maintains optimal cardiac output and hemodynamic stability  8/3/2023 0414 by Lashell Nguyen RN  Outcome: Progressing     Problem: Musculoskeletal - Adult  Goal: Return mobility to safest level of function  8/3/2023 0414 by Lashell Nguyen RN  Outcome: Progressing     Problem: Skin/Tissue Integrity - Adult  Goal: Skin integrity remains intact  8/3/2023 0414 by Lashell Nguyen RN  Outcome: Progressing  Flowsheets (Taken 8/2/2023 1534 by Keren Woodward RN)  Skin Integrity Remains Intact: Monitor for areas of redness and/or skin breakdown     Problem: Infection - Adult  Goal: Absence of infection at discharge  8/3/2023 0414 by Lashell Nguyen RN  Outcome: Progressing     Problem: Genitourinary - Adult  Goal: Absence of urinary retention  8/3/2023 0414 by Ashley López RN  Outcome: Progressing     Problem: Genitourinary - Adult  Goal: Urinary catheter remains patent  8/2/2023 1529 by Tru Acosta RN  Outcome: Progressing     Problem: Gastrointestinal - Adult  Goal: Maintains or returns to baseline bowel function  8/2/2023 1529 by Tru Acosta RN  Outcome: Progressing     Problem: Coping  Goal: Pt/Family able to verbalize concerns and demonstrate effective coping strategies  Description: INTERVENTIONS:  1. Assist patient/family to identify coping skills, available support systems and cultural and spiritual values  2. Provide emotional support, including active listening and acknowledgement of concerns of patient and caregivers  3. Reduce environmental stimuli, as able  4. Instruct patient/family in relaxation techniques, as appropriate  5. Assess for spiritual pain/suffering and initiate Spiritual Care, Psychosocial Clinical Specialist consults as needed  8/3/2023 0414 by Ashley López RN  Outcome: Progressing     Problem: Decision Making  Goal: Pt/Family able to effectively weigh alternatives and participate in decision making related to treatment and care  Description: INTERVENTIONS:  1. Determine when there are differences between patient's view, family's view, and healthcare provider's view of condition  2. Facilitate patient and family articulation of goals for care  3. Help patient and family identify pros/cons of alternative solutions  4. Provide information as requested by patient/family  5. Respect patient/family right to receive or not to receive information  6. Serve as a liaison between patient and family and health care team  7.  Initiate Consults from Ethics, Palliative Care or initiate [id number] as is appropriate  8/3/2023 0414 by Ashley López RN  Outcome: Progressing     Problem: Confusion  Goal: Confusion, delirium, dementia, or psychosis is improved or at

## 2023-08-03 NOTE — PROGRESS NOTES
PALLIATIVE MEDICINE PROGRESS NOTE     Patient name:Sherry Andre    FTD:[id number] :1945  Room/Bed:K0M-7187/5279-01    LOS: 9 days        ASSESSMENT/RECOMMENDATIONS     65 y.o. female with anemia, altered mental status and debility secondary to prior CVA. Anemia -history of chronic hemolytic anemia, oncology following. GI signed off. Hematology stating can only do supportive care. Hgb continues to trend down, transfusion on 23 with improvement. Will continue to monitor trends. Debility -secondary to CVA. PT OT ordered, mild participation. Up with lift. DENISE on CKD -nephrology consulted. Stable. Severe protein calorie malnutrition -p.o. intake minimal, last albumin on 2023 was 2.5. Discussed feeding tube for additional nutrition support, patient declined. Goals of Care -see below. Patient/Family Goals of Care :    23 - Conversation with the patient and  at the bedside, patient minimally participatory. We discussed goals of care in the setting of the patient's decline since CVA. We reviewed CODE STATUS options as well as potential placement versus home in the future. Patient's  unable to verbalize goals. When addressing CODE STATUS, he states he would like to have that discussion with the patient when she is \"lucid\". Reminded that the patient has not been able to have a quality conversation prior to her CVA, may be her baseline. We reviewed that unfortunately in some circumstances, the patient's next of kin does have to make these decisions and the patient is unable. The patient's  became tearful, he understands that this is a grim situation, however is not able to make these decisions at this time. We discussed quality of life over quantity. Unfortunately, the patient's  has a minimal support system. He does have 2 sons from a prior marriage, encouraged him to reach out to them for support.   We will continue to have gentle conversations as

## 2023-08-03 NOTE — PROGRESS NOTES
Patient alert at times during shift but overall remains drowsy. Calm and cooperative with care but she is confused at times and having visual hallucinations at times. Patient's  at bedside this afternoon and states patient is more alert today than she has been. Patient up to chair today with maximove and much encouragement. Very minimal po intake, patient only taking sips of Ensure. Patient requires assist and encouragement to eat/drink. Continue to encourage po. Patient's  requesting patient have a pureed diet, thinking it will be more beneficial for patient. Okay per Dr. January Salas to change diet to pureed. Continue to monitor.

## 2023-08-04 PROBLEM — E44.0 MODERATE MALNUTRITION (HCC): Status: ACTIVE | Noted: 2023-08-04

## 2023-08-04 LAB
BASOPHILS # BLD: 0 K/UL (ref 0–0.2)
BASOPHILS NFR BLD: 0.6 %
DEPRECATED RDW RBC AUTO: 18 % (ref 12.4–15.4)
EOSINOPHIL # BLD: 0.3 K/UL (ref 0–0.6)
EOSINOPHIL NFR BLD: 7.6 %
GLUCOSE BLD-MCNC: 113 MG/DL (ref 70–99)
GLUCOSE BLD-MCNC: 119 MG/DL (ref 70–99)
GLUCOSE BLD-MCNC: 119 MG/DL (ref 70–99)
HCT VFR BLD AUTO: 27.9 % (ref 36–48)
HGB BLD-MCNC: 9.5 G/DL (ref 12–16)
LYMPHOCYTES # BLD: 0.5 K/UL (ref 1–5.1)
LYMPHOCYTES NFR BLD: 13.5 %
MCH RBC QN AUTO: 31.4 PG (ref 26–34)
MCHC RBC AUTO-ENTMCNC: 33.9 G/DL (ref 31–36)
MCV RBC AUTO: 92.6 FL (ref 80–100)
MONOCYTES # BLD: 0.4 K/UL (ref 0–1.3)
MONOCYTES NFR BLD: 9.6 %
NEUTROPHILS # BLD: 2.7 K/UL (ref 1.7–7.7)
NEUTROPHILS NFR BLD: 68.7 %
PERFORMED ON: ABNORMAL
PLATELET # BLD AUTO: 200 K/UL (ref 135–450)
PMV BLD AUTO: 7.6 FL (ref 5–10.5)
RBC # BLD AUTO: 3.02 M/UL (ref 4–5.2)
WBC # BLD AUTO: 3.9 K/UL (ref 4–11)

## 2023-08-04 PROCEDURE — 2580000003 HC RX 258: Performed by: INTERNAL MEDICINE

## 2023-08-04 PROCEDURE — 6370000000 HC RX 637 (ALT 250 FOR IP): Performed by: INTERNAL MEDICINE

## 2023-08-04 PROCEDURE — 6360000002 HC RX W HCPCS: Performed by: INTERNAL MEDICINE

## 2023-08-04 PROCEDURE — 85025 COMPLETE CBC W/AUTO DIFF WBC: CPT

## 2023-08-04 PROCEDURE — 94760 N-INVAS EAR/PLS OXIMETRY 1: CPT

## 2023-08-04 PROCEDURE — 2060000000 HC ICU INTERMEDIATE R&B

## 2023-08-04 RX ORDER — LORAZEPAM 2 MG/ML
0.5 INJECTION INTRAMUSCULAR
Status: DISCONTINUED | OUTPATIENT
Start: 2023-08-04 | End: 2023-08-05 | Stop reason: HOSPADM

## 2023-08-04 RX ORDER — MORPHINE SULFATE 2 MG/ML
2 INJECTION, SOLUTION INTRAMUSCULAR; INTRAVENOUS
Status: DISCONTINUED | OUTPATIENT
Start: 2023-08-04 | End: 2023-08-05 | Stop reason: HOSPADM

## 2023-08-04 RX ADMIN — FUROSEMIDE 20 MG: 20 TABLET ORAL at 21:14

## 2023-08-04 RX ADMIN — LORAZEPAM 0.5 MG: 2 INJECTION INTRAMUSCULAR; INTRAVENOUS at 17:46

## 2023-08-04 RX ADMIN — SODIUM CHLORIDE, PRESERVATIVE FREE 10 ML: 5 INJECTION INTRAVENOUS at 21:25

## 2023-08-04 RX ADMIN — DEXTROSE MONOHYDRATE: 50 INJECTION, SOLUTION INTRAVENOUS at 05:30

## 2023-08-04 RX ADMIN — LOSARTAN POTASSIUM 100 MG: 100 TABLET, FILM COATED ORAL at 09:39

## 2023-08-04 RX ADMIN — ESTRADIOL 2 MG: 1 TABLET ORAL at 09:39

## 2023-08-04 RX ADMIN — FUROSEMIDE 20 MG: 20 TABLET ORAL at 09:39

## 2023-08-04 RX ADMIN — DILTIAZEM HYDROCHLORIDE 30 MG: 30 TABLET, FILM COATED ORAL at 13:04

## 2023-08-04 RX ADMIN — HYDRALAZINE HYDROCHLORIDE 50 MG: 50 TABLET, FILM COATED ORAL at 14:35

## 2023-08-04 RX ADMIN — FOLIC ACID 1000 MCG: 1 TABLET ORAL at 09:39

## 2023-08-04 RX ADMIN — ASPIRIN 81 MG: 81 TABLET, COATED ORAL at 09:39

## 2023-08-04 RX ADMIN — HEPARIN SODIUM 5000 UNITS: 5000 INJECTION INTRAVENOUS; SUBCUTANEOUS at 21:14

## 2023-08-04 RX ADMIN — LORAZEPAM 0.5 MG: 2 INJECTION INTRAMUSCULAR; INTRAVENOUS at 21:29

## 2023-08-04 RX ADMIN — HYDRALAZINE HYDROCHLORIDE 50 MG: 50 TABLET, FILM COATED ORAL at 21:13

## 2023-08-04 RX ADMIN — LEVOTHYROXINE SODIUM 150 MCG: 0.07 TABLET ORAL at 05:20

## 2023-08-04 RX ADMIN — HEPARIN SODIUM 5000 UNITS: 5000 INJECTION INTRAVENOUS; SUBCUTANEOUS at 05:21

## 2023-08-04 RX ADMIN — ATORVASTATIN CALCIUM 20 MG: 20 TABLET, FILM COATED ORAL at 21:13

## 2023-08-04 RX ADMIN — HYDRALAZINE HYDROCHLORIDE 50 MG: 50 TABLET, FILM COATED ORAL at 09:39

## 2023-08-04 RX ADMIN — MEROPENEM 1000 MG: 1 INJECTION, POWDER, FOR SOLUTION INTRAVENOUS at 01:31

## 2023-08-04 RX ADMIN — FENOFIBRATE 54 MG: 54 TABLET, FILM COATED ORAL at 09:39

## 2023-08-04 RX ADMIN — HEPARIN SODIUM 5000 UNITS: 5000 INJECTION INTRAVENOUS; SUBCUTANEOUS at 14:33

## 2023-08-04 ASSESSMENT — PAIN SCALES - WONG BAKER
WONGBAKER_NUMERICALRESPONSE: 0

## 2023-08-04 NOTE — CARE COORDINATION
SW attempted to meet with pt/spouse, spouse was not present. Pt was sleeping. SW called spouse and spoke with him about hospice choices. He will be in later this afternoon, SW will bring choices to him.     Antonio Purvis LMSW, [address] [place] [address] Social Work Case Management   Phone: [phone]  Fax: [phone]

## 2023-08-04 NOTE — CONSULTS
HOC      P/c to  who did not answer, LVM to set up [facility] meeting. Left contact info. No IPCC beds available at this time. Will FU tomorrow.      Thank you for this referral,   Please call [facility] with questions/ concerns at [phone]  [address] [place] [address] Admissions Liaison

## 2023-08-04 NOTE — PROGRESS NOTES
Comprehensive Nutrition Assessment    Type and Reason for Visit:  Reassess    Nutrition Recommendations/Plan:   Continue Ensure EnliveTid  Continue Dysphagia Pureed / No Added Salt / [address] (5)     Malnutrition Assessment:  Malnutrition Status: Moderate malnutrition (08/04/23 1321)    Context:  Acute Illness     Findings of the 6 clinical characteristics of malnutrition:  Energy Intake:  50% or less of estimated energy requirements for 5 or more days  Weight Loss:  No significant weight loss     Body Fat Loss:  Mild body fat loss Orbital   Muscle Mass Loss:  Mild muscle mass loss Temples (temporalis)  Fluid Accumulation:  Moderate to Severe Extremities   Strength:  Not Performed    Nutrition Assessment:    Follow-up. Pt continues with poor intake limiting po to sips of Ensure Enlive.  requested that texture be modified to pureed for hopeful improved po intake. Noted pt with confusion and visual hallucinations.  continues to consider hospice care. Will continue to follow MD plan for care moving forward. Nutrition Related Findings:    Labs reviewed. Noted small BM on 8/2. Noted pitting generalized edema and +2 pitting edema to BUE and pitting +3 edema to BLE. Wound Type: Skin Tears, Moisture Associate Skin Damage (MASD to buttocks, Skin Tear to right thigh)       Current Nutrition Intake & Therapies:    Average Meal Intake: 0%  Average Supplements Intake:  (sips of Ensure)  ADULT ORAL NUTRITION SUPPLEMENT; Breakfast, Lunch, Dinner; Standard High Calorie/High Protein Oral Supplement  ADULT DIET; Dysphagia - Pureed; 5 carb choices (75 gm/meal); No Added Salt (3-4 gm)    Anthropometric Measures:  Height: 5' 3\" (160 cm)  Ideal Body Weight (IBW): 115 lbs (52 kg)    Admission Body Weight: 271 lb (122.9 kg)  Current Body Weight: 271 lb (122.9 kg),   IBW.  Weight Source: Bed Scale  Current BMI (kg/m2): 48  Usual Body Weight:  (270s per record over the past 2 years)                       BMI Categories: Obese

## 2023-08-04 NOTE — PROGRESS NOTES
[facility]  Palliative Care   Progress Note    NAME:  Mic Kumari RECORD NUMBER:  [id number]  AGE: 65 y.o. GENDER: female  : 1945  TODAY'S DATE:  2023    Subjective: Patient, oriented to self, knows  at bedside, not answering questions. Objective:    Vitals:    23 1435   BP: 136/83   Pulse:    Resp:    Temp:    SpO2:      Lab Results   Component Value Date    WBC 3.9 (L) 2023    HGB 9.5 (L) 2023    HCT 27.9 (L) 2023    MCV 92.6 2023     2023     Lab Results   Component Value Date    CREATININE 1.3 (H) 2023    BUN 24 (H) 2023     2023    K 4.2 2023     2023    CO2 29 2023     Lab Results   Component Value Date    ALT 13 2023    AST 17 2023    ALKPHOS 47 2023    BILITOT 0.4 2023       Plan: meeting with her , nephew and sister in law to discuss hospice options. Home care vs ecf vs inpatient. Decision was made home would not be option, they don't like idea of ECF at this time. Inpatient is what they decided is best option at this time based on her needing symptom management. We did discuss stopping IV fluids and all aggressive care at this time and he is agreeable. Monitor discontinued per patient and family request, messaged Dr Vinicio San to start comfort meds for hospice care per family/patient request.   1291 orders noted from Dr Vinicio San.     Code Status: DNR-CC  Discharge Environment:  [x] Hospice Consult Agency: list provided [facility] [place] chosen   [x] Inpatient Hospice  vs   [x] Other: GIP if needed. Teaching Time:  0hours  30 min     I will continue to follow Ms. Kapoor's care as needed. Thank you for allowing me to participate in the care of Ms. Jackson Herrera .      Electronically signed by Mohamud Sousa RN, BSN,CHPN on 2023 at 3:27 PM  [address] [facility]  Office: [phone]

## 2023-08-04 NOTE — PROGRESS NOTES
Physical Therapy  Chart reviewed, noted Pt. Is likely going to Hospice. Pt. Has been confused. Pt. Was sleeping in bed. Deferred therapy at this time. Will reattempt later date if Pt. Improves or decides against Hospice.   Erica Martinez, [place], [id number]

## 2023-08-04 NOTE — PROGRESS NOTES
V2.0  [facility] Hospitalist Progress Note      Name:  Ron Forbes /Age/Sex: 1945  (65 y.o. female)   MRN & CSN:  [id number] & [id number] Encounter Date/Time: 2023 4:40 PM EDT    Location:  [address] PCP: Jose Guadalupe Glasgow MD       Hospital Day: 11    Assessment and Plan:   Ron Forbes is a 65 y.o. female with pmh of recent stroke, hemolytic anemia who presents with altered mental status/generalized weakness and was found to have DENISE (acute kidney injury) ([address]) in addition to dropping hemoglobin      Plan:  Hemolytic anemia. Has been a chronic issue. Status post blood transfusion. Hemoglobin has come up nicely. DENISE. Improved. UTI-present on admission. On antibiotic and she will finish the course soon  Chronic kidney disease with baseline creatinine about 1.4 nephrology following and help appreciated  Acute metabolic encephalopathy-multifactorial including anemia/UTI/DENISE. She appears to be back to her baseline  History of recent stroke she stayed in bed most of the time. Palliative care on board. Patient/family deciding about hospice issues  Elevated troponin-likely demand ischemia. Medical management  Essential hypertension. Blood pressure is stable. Heart rate still on the higher side. Would continue with Cardizem  Hypernatremia. Improved            Diet ADULT ORAL NUTRITION SUPPLEMENT; Breakfast, Lunch, Dinner; Standard High Calorie/High Protein Oral Supplement  ADULT DIET; Dysphagia - Pureed; 5 carb choices (75 gm/meal);  No Added Salt (3-4 gm)   DVT Prophylaxis [] Lovenox, []  Heparin, [x] SCDs, [] Ambulation,  [] Eliquis, [] Xarelto  [] Coumadin   Code Status DNR-CC   Disposition From: Skilled facility  Expected Disposition: Palliative team to meet with family  Estimated Date of Discharge: Depending upon disposition with hospice  Patient requires continued admission due to anemia/electrolytes abnormalities   Surrogate Decision Maker/ POA Patient/family     Personally

## 2023-08-04 NOTE — PLAN OF CARE
Problem: Discharge Planning  Goal: Discharge to home or other facility with appropriate resources  8/3/2023 2124 by Bharathi Ventura RN  Outcome: Progressing  Flowsheets (Taken 8/3/2023 2124)  Discharge to home or other facility with appropriate resources:   Identify barriers to discharge with patient and caregiver   Identify discharge learning needs (meds, wound care, etc)   Arrange for needed discharge resources and transportation as appropriate     Problem: Skin/Tissue Integrity  Goal: Absence of new skin breakdown  Description: 1. Monitor for areas of redness and/or skin breakdown  2. Assess vascular access sites hourly  3. Every 4-6 hours minimum:  Change oxygen saturation probe site  4. Every 4-6 hours:  If on nasal continuous positive airway pressure, respiratory therapy assess nares and determine need for appliance change or resting period.   8/3/2023 2124 by Bharathi Ventura RN  Outcome: Progressing     Problem: Safety - Adult  Goal: Free from fall injury  8/3/2023 2124 by Bharathi Ventura RN  Outcome: Progressing  Flowsheets (Taken 8/3/2023 2124)  Free From Fall Injury: Instruct family/caregiver on patient safety     Problem: Pain  Goal: Verbalizes/displays adequate comfort level or baseline comfort level  8/3/2023 2124 by Bharathi Ventura RN  Outcome: Progressing  Flowsheets (Taken 8/3/2023 2124)  Verbalizes/displays adequate comfort level or baseline comfort level:   Encourage patient to monitor pain and request assistance   Assess pain using appropriate pain scale   Administer analgesics based on type and severity of pain and evaluate response     Problem: Neurosensory - Adult  Goal: Achieves stable or improved neurological status  8/3/2023 2124 by Bharathi Ventura RN  Outcome: Progressing  Flowsheets (Taken 8/3/2023 2124)  Achieves stable or improved neurological status:   Assess for and report changes in neurological status   Initiate measures to prevent increased intracranial pressure     Problem:

## 2023-08-05 VITALS
HEIGHT: 63 IN | TEMPERATURE: 99.4 F | OXYGEN SATURATION: 93 % | HEART RATE: 110 BPM | DIASTOLIC BLOOD PRESSURE: 78 MMHG | RESPIRATION RATE: 16 BRPM | BODY MASS INDEX: 47.42 KG/M2 | WEIGHT: 267.64 LBS | SYSTOLIC BLOOD PRESSURE: 169 MMHG

## 2023-08-05 PROBLEM — D69.6 THROMBOCYTOPENIA (HCC): Status: ACTIVE | Noted: 2023-08-05

## 2023-08-05 PROBLEM — N17.9 AKI (ACUTE KIDNEY INJURY) (HCC): Status: RESOLVED | Noted: 2023-07-25 | Resolved: 2023-08-05

## 2023-08-05 PROCEDURE — 6360000002 HC RX W HCPCS: Performed by: INTERNAL MEDICINE

## 2023-08-05 PROCEDURE — 2580000003 HC RX 258: Performed by: INTERNAL MEDICINE

## 2023-08-05 RX ADMIN — MORPHINE SULFATE 2 MG: 2 INJECTION, SOLUTION INTRAMUSCULAR; INTRAVENOUS at 15:49

## 2023-08-05 RX ADMIN — MORPHINE SULFATE 2 MG: 2 INJECTION, SOLUTION INTRAMUSCULAR; INTRAVENOUS at 02:57

## 2023-08-05 RX ADMIN — LORAZEPAM 0.5 MG: 2 INJECTION INTRAMUSCULAR; INTRAVENOUS at 06:02

## 2023-08-05 RX ADMIN — SODIUM CHLORIDE, PRESERVATIVE FREE 10 ML: 5 INJECTION INTRAVENOUS at 12:46

## 2023-08-05 ASSESSMENT — PAIN SCALES - GENERAL
PAINLEVEL_OUTOF10: 5
PAINLEVEL_OUTOF10: 0

## 2023-08-05 ASSESSMENT — PAIN SCALES - WONG BAKER
WONGBAKER_NUMERICALRESPONSE: 0

## 2023-08-05 NOTE — PROGRESS NOTES
Hospice of [address] [place] [address]    Met with pt's spouse Shira Ley to discuss hospice services. Consents signed for hospice care. Pt to be transferred to the [address] [place] [address] Unit today at 15:15 by CMT. Discharge packet completed. Nurse Kya Allison updated and will notify physician to please enter discharge order.     Thank you,  Paige Barfield RN  [phone]

## 2023-08-05 NOTE — CARE COORDINATION
Case Management Discharge Note          Date / Time of Note: 8/5/2023 1:00 PM                  Patient Name: Penny Mckinney   YOB: 1945  Diagnosis: Hyperkalemia [E87.5]  Thrombocytopenia ([address]) [D69.6]  Elevated troponin [R77.8]  DENISE (acute kidney injury) ([address]) [N17.9]  Goals of care, counseling/discussion [Z71.89]  Hypotension, unspecified hypotension type [I95.9]  Acute kidney injury superimposed on chronic kidney disease ([address]) [N17.9, N18.9]  Anemia, unspecified type [D64.9]   Date / Time: 7/25/2023 12:25 PM    Financial:  Payor: Mayela Marquis / Plan: MEDICARE PART A AND B / Product Type: *No Product type* /      Pharmacy:    [facility] [id number] - [address] [place] [address], [address] [place] [address]  Phone: [phone] Fax: [phone]      Assistance purchasing medications?: Potential Assistance Purchasing Medications: No  Assistance provided by Case Management: None at this time    DISCHARGE Disposition: [address] [place] [address]:  Location: Inpatient Unit  Agency: [facility]  Phone: [phone]    Consents signed: Yes    Transportation:  Transportation PLAN for discharge: EMS transportation   Mode of Transport: Ambulance stretcher - BLS  Reason for medical transport: Bed confined: Meets the following criteria 1) unable to get out of bed without assistance or ambulate, 2) unable to safely sit up in a wheelchair, 3) unable to maintain erect seating position in a chair for time needed for transport  Name of [address] [place] [address] Road: [facility]  Phone: [phone]  Time of Transport: 3:15 pm    Transport form completed: Yes    IMM Completed:   Not Indicated    Additional CM Notes:   Discharge order noted. Transportation arranged by [employer] with [facility] at 3:15 pm.  Patient transferring to [facility] inpatient unit.       The Plan for Transition of Care is